# Patient Record
Sex: FEMALE | Race: WHITE | Employment: PART TIME | URBAN - METROPOLITAN AREA
[De-identification: names, ages, dates, MRNs, and addresses within clinical notes are randomized per-mention and may not be internally consistent; named-entity substitution may affect disease eponyms.]

---

## 2017-03-27 ENCOUNTER — MED REC SCAN ONLY (OUTPATIENT)
Dept: FAMILY MEDICINE CLINIC | Facility: CLINIC | Age: 72
End: 2017-03-27

## 2017-04-19 ENCOUNTER — OFFICE VISIT (OUTPATIENT)
Dept: FAMILY MEDICINE CLINIC | Facility: CLINIC | Age: 72
End: 2017-04-19

## 2017-04-19 VITALS
HEART RATE: 76 BPM | OXYGEN SATURATION: 97 % | TEMPERATURE: 98 F | HEIGHT: 63 IN | SYSTOLIC BLOOD PRESSURE: 166 MMHG | DIASTOLIC BLOOD PRESSURE: 90 MMHG | WEIGHT: 223 LBS | RESPIRATION RATE: 16 BRPM | BODY MASS INDEX: 39.51 KG/M2

## 2017-04-19 DIAGNOSIS — R45.89 FLAT AFFECT: ICD-10-CM

## 2017-04-19 DIAGNOSIS — M54.50 ACUTE LEFT-SIDED LOW BACK PAIN WITHOUT SCIATICA: Primary | ICD-10-CM

## 2017-04-19 DIAGNOSIS — Z91.89 LACK OF MOTIVATION: ICD-10-CM

## 2017-04-19 DIAGNOSIS — R53.83 LOW ENERGY: ICD-10-CM

## 2017-04-19 PROCEDURE — 99214 OFFICE O/P EST MOD 30 MIN: CPT | Performed by: FAMILY MEDICINE

## 2017-04-19 RX ORDER — CYCLOBENZAPRINE HCL 10 MG
10 TABLET ORAL NIGHTLY
Qty: 14 TABLET | Refills: 0 | Status: SHIPPED | OUTPATIENT
Start: 2017-04-19 | End: 2017-05-03

## 2017-04-19 NOTE — PROGRESS NOTES
Katelin Black is a 73year old female. HPI:   Pt is c/o left flank pain. Started 2 days ago, noticed it when she got up from putting a pan away. Taking a deep breath makes it worse, bending and twisting, sneezing, yawning and laughing makes it worse.  3 ib ANGIOPLASTY (CORONARY)  2010    Comment stent placed by Dr. Kimberley Ortiz      Family History   Problem Relation Age of Onset   • Heart Disorder Father      pacemaker   • Cancer Mother      leukemia   • Cancer Sister      Jose Pritchett pre-cancer\"   • Cancer Materna understanding of these issues and agrees to the plan. There are no Patient Instructions on file for this visit.   Acute left-sided low back pain without sciatica  (primary encounter diagnosis)  Flat affect  Lack of motivation  Low energy    No orders of

## 2017-11-15 ENCOUNTER — MED REC SCAN ONLY (OUTPATIENT)
Dept: FAMILY MEDICINE CLINIC | Facility: CLINIC | Age: 72
End: 2017-11-15

## 2018-02-16 ENCOUNTER — TELEPHONE (OUTPATIENT)
Dept: FAMILY MEDICINE CLINIC | Facility: CLINIC | Age: 73
End: 2018-02-16

## 2018-02-16 RX ORDER — PROMETHAZINE HYDROCHLORIDE AND CODEINE PHOSPHATE 6.25; 1 MG/5ML; MG/5ML
SYRUP ORAL EVERY 6 HOURS PRN
Qty: 180 ML | Refills: 0 | Status: SHIPPED
Start: 2018-02-16 | End: 2018-10-30 | Stop reason: ALTCHOICE

## 2018-02-16 NOTE — TELEPHONE ENCOUNTER
Pt called, has a bad cough and was wondering if Dr. Galileo Hunt could just call her in something for it? [id number].    Please call pt at [phone]

## 2018-02-16 NOTE — TELEPHONE ENCOUNTER
Spoke with the pt and advised of the notes from Dr. Cash Cutler- she v/u she would like the cough suppressant advised that this will make her tired- she v/u

## 2018-02-16 NOTE — TELEPHONE ENCOUNTER
Likely viral, so I would keep up symptomatic care, if she'd like a cough suppressant can do phenergan with codeine, can make her groggy through, I'd only do at night, can try sudafed and/or mucinex during the day, go ahead and continue the emergen-c for an

## 2018-02-16 NOTE — TELEPHONE ENCOUNTER
Spoke with the pt and her symtpoms started yesterday and it is a cough and stuffy nose and head is fuzzy  She has just taken emergen C  She is not SOB  No Fever  She is asking for something to be called in

## 2018-03-01 ENCOUNTER — PATIENT OUTREACH (OUTPATIENT)
Dept: FAMILY MEDICINE CLINIC | Facility: CLINIC | Age: 73
End: 2018-03-01

## 2018-06-27 ENCOUNTER — PATIENT OUTREACH (OUTPATIENT)
Dept: FAMILY MEDICINE CLINIC | Facility: CLINIC | Age: 73
End: 2018-06-27

## 2018-08-23 ENCOUNTER — PATIENT OUTREACH (OUTPATIENT)
Dept: FAMILY MEDICINE CLINIC | Facility: CLINIC | Age: 73
End: 2018-08-23

## 2018-10-30 ENCOUNTER — OFFICE VISIT (OUTPATIENT)
Dept: FAMILY MEDICINE CLINIC | Facility: CLINIC | Age: 73
End: 2018-10-30
Payer: MEDICARE

## 2018-10-30 VITALS
RESPIRATION RATE: 14 BRPM | HEART RATE: 70 BPM | BODY MASS INDEX: 41.23 KG/M2 | HEIGHT: 63.25 IN | DIASTOLIC BLOOD PRESSURE: 76 MMHG | TEMPERATURE: 98 F | WEIGHT: 235.63 LBS | SYSTOLIC BLOOD PRESSURE: 124 MMHG

## 2018-10-30 DIAGNOSIS — I10 ESSENTIAL HYPERTENSION, BENIGN: ICD-10-CM

## 2018-10-30 DIAGNOSIS — G47.33 OBSTRUCTIVE SLEEP APNEA: ICD-10-CM

## 2018-10-30 DIAGNOSIS — Z00.00 ENCOUNTER FOR ANNUAL HEALTH EXAMINATION: Primary | ICD-10-CM

## 2018-10-30 DIAGNOSIS — E55.9 VITAMIN D DEFICIENCY: ICD-10-CM

## 2018-10-30 DIAGNOSIS — Z87.891 HISTORY OF TOBACCO ABUSE: ICD-10-CM

## 2018-10-30 DIAGNOSIS — Z12.11 COLON CANCER SCREENING: ICD-10-CM

## 2018-10-30 DIAGNOSIS — Z11.59 NEED FOR HEPATITIS C SCREENING TEST: ICD-10-CM

## 2018-10-30 DIAGNOSIS — Z12.31 VISIT FOR SCREENING MAMMOGRAM: ICD-10-CM

## 2018-10-30 DIAGNOSIS — R73.9 ELEVATED BLOOD SUGAR: ICD-10-CM

## 2018-10-30 DIAGNOSIS — E66.01 CLASS 3 SEVERE OBESITY WITH SERIOUS COMORBIDITY AND BODY MASS INDEX (BMI) OF 40.0 TO 44.9 IN ADULT, UNSPECIFIED OBESITY TYPE (HCC): ICD-10-CM

## 2018-10-30 DIAGNOSIS — I25.10 CORONARY ARTERY DISEASE INVOLVING NATIVE CORONARY ARTERY OF NATIVE HEART WITHOUT ANGINA PECTORIS: ICD-10-CM

## 2018-10-30 DIAGNOSIS — M76.61 ACHILLES TENDINITIS OF RIGHT LOWER EXTREMITY: ICD-10-CM

## 2018-10-30 DIAGNOSIS — E78.2 MIXED HYPERLIPIDEMIA: ICD-10-CM

## 2018-10-30 DIAGNOSIS — Z23 NEED FOR VACCINATION: ICD-10-CM

## 2018-10-30 DIAGNOSIS — Z78.0 POSTMENOPAUSAL ESTROGEN DEFICIENCY: ICD-10-CM

## 2018-10-30 PROCEDURE — 80053 COMPREHEN METABOLIC PANEL: CPT | Performed by: FAMILY MEDICINE

## 2018-10-30 PROCEDURE — 86803 HEPATITIS C AB TEST: CPT | Performed by: FAMILY MEDICINE

## 2018-10-30 PROCEDURE — 85025 COMPLETE CBC W/AUTO DIFF WBC: CPT | Performed by: FAMILY MEDICINE

## 2018-10-30 PROCEDURE — 82306 VITAMIN D 25 HYDROXY: CPT | Performed by: FAMILY MEDICINE

## 2018-10-30 PROCEDURE — G0009 ADMIN PNEUMOCOCCAL VACCINE: HCPCS | Performed by: FAMILY MEDICINE

## 2018-10-30 PROCEDURE — 80061 LIPID PANEL: CPT | Performed by: FAMILY MEDICINE

## 2018-10-30 PROCEDURE — 84443 ASSAY THYROID STIM HORMONE: CPT | Performed by: FAMILY MEDICINE

## 2018-10-30 PROCEDURE — 36415 COLL VENOUS BLD VENIPUNCTURE: CPT | Performed by: FAMILY MEDICINE

## 2018-10-30 PROCEDURE — 83036 HEMOGLOBIN GLYCOSYLATED A1C: CPT | Performed by: FAMILY MEDICINE

## 2018-10-30 PROCEDURE — 90670 PCV13 VACCINE IM: CPT | Performed by: FAMILY MEDICINE

## 2018-10-30 PROCEDURE — G0439 PPPS, SUBSEQ VISIT: HCPCS | Performed by: FAMILY MEDICINE

## 2018-10-30 PROCEDURE — 81001 URINALYSIS AUTO W/SCOPE: CPT | Performed by: FAMILY MEDICINE

## 2018-10-30 RX ORDER — IRBESARTAN AND HYDROCHLOROTHIAZIDE 150; 12.5 MG/1; MG/1
1 TABLET, FILM COATED ORAL
COMMUNITY
Start: 2018-08-29 | End: 2020-06-29 | Stop reason: ALTCHOICE

## 2018-10-30 RX ORDER — METOPROLOL SUCCINATE 100 MG/1
100 TABLET, EXTENDED RELEASE ORAL DAILY
COMMUNITY
Start: 2017-11-15

## 2018-10-30 NOTE — PROGRESS NOTES
HPI:   Nafisa Estrella is a 72year old female who presents for a Medicare Subsequent Annual Wellness visit (Pt already had Initial Annual Wellness). Nothing major, feelign great overall. Has some aches and pains and wonders about taking turmeric.   Has years: 48        Quit date: 2012        Years since quittin.9      Smokeless tobacco: Never Used       Ms. Maldonado already takes aspirin and has it on her medication list.   CAGE Alcohol screening   Nicolas Doyle was screened for Alcohol abuse and mayer history that includes  and angioplasty (coronary) (). Her family history includes Cancer in her maternal aunt, mother, and sister; Heart Disorder in her father. SOCIAL HISTORY:   She  reports that she quit smoking about 5 years ago.  She has a Eyes:  PERRL, conjunctiva/corneas clear, EOM's intact both eyes   Ears:  Normal TM's and external ear canals, both ears   Nose: Nares normal, septum midline,mucosa normal, no drainage or sinus tenderness   Throat: Lips, mucosa, and tongue normal; teeth a DIFFERENTIAL WITH PLATELET; Future  -     COMP METABOLIC PANEL (14); Future  -     LIPID PANEL;  Future  -     ASSAY, THYROID STIM HORMONE; Future  -     VITAMIN D, 25-HYDROXY; Future  -     HEMOGLOBIN A1C; Future  -     SURGERY - INTERNAL  -     XR DEXA RAOUL lung cancer screening, rationale for, patient declines today  -     URINALYSIS, ROUTINE; Future    Obstructive sleep apnea  -suspected, did not further discuss today  Class 3 severe obesity with serious comorbidity and body mass index (BMI) of 40.0 to 44. 9 SERVICES  INDICATIONS AND SCHEDULE Internal Lab or Procedure External Lab or Procedure   Diabetes Screening      HbgA1C   Annually Lab Results   Component Value Date    A1C 5.6 12/28/2016    No flowsheet data found.     Fasting Blood Sugar (FSB)Annually Glu Please get once after your 65th birthday    Hepatitis B for Moderate/High Risk No vaccine history found Medium/high risk factors:   End-stage renal disease   Hemophiliacs who received Factor VIII or IX concentrates   Clients of institutions for the [facility]

## 2018-11-05 ENCOUNTER — TELEPHONE (OUTPATIENT)
Dept: FAMILY MEDICINE CLINIC | Facility: CLINIC | Age: 73
End: 2018-11-05

## 2018-11-05 DIAGNOSIS — R79.89 LOW VITAMIN D LEVEL: Primary | ICD-10-CM

## 2018-11-05 RX ORDER — ERGOCALCIFEROL 1.25 MG/1
50000 CAPSULE ORAL WEEKLY
Qty: 12 CAPSULE | Refills: 0 | Status: SHIPPED | OUTPATIENT
Start: 2018-11-05 | End: 2018-12-05

## 2018-11-05 NOTE — TELEPHONE ENCOUNTER
Patient notified and verbalized understanding of information given. Medication sent to pharmacy. Recall placed in computer.

## 2018-11-05 NOTE — TELEPHONE ENCOUNTER
----- Message from Yahir Olguin MD sent at 11/3/2018  4:34 PM CDT -----  Labs look good overall, normal blood conts, kidneys, liver, electrolytes, thyroid, cholestrol, urinalysis. Hep C negative.   Blood sugar just barely elevated, just outisde the [employer]

## 2018-11-29 ENCOUNTER — PATIENT OUTREACH (OUTPATIENT)
Dept: FAMILY MEDICINE CLINIC | Facility: CLINIC | Age: 73
End: 2018-11-29

## 2018-12-10 RX ORDER — ERGOCALCIFEROL 1.25 MG/1
50000 CAPSULE ORAL WEEKLY
Qty: 12 CAPSULE | Refills: 0 | OUTPATIENT
Start: 2018-12-10 | End: 2019-01-09

## 2018-12-10 NOTE — TELEPHONE ENCOUNTER
Shouldn't need this filled, had 3 months filled in nov, should get her through the 3 months until the next blood draw in [place]

## 2018-12-10 NOTE — TELEPHONE ENCOUNTER
Last refilled on 11/5/18 for # 12 with 0 refills  Last vit d 13.1 on 10/30/18  Last OV 10/30/18  No future appointments. Thank you.

## 2019-02-27 ENCOUNTER — PATIENT OUTREACH (OUTPATIENT)
Dept: FAMILY MEDICINE CLINIC | Facility: CLINIC | Age: 74
End: 2019-02-27

## 2019-03-11 ENCOUNTER — PATIENT OUTREACH (OUTPATIENT)
Dept: FAMILY MEDICINE CLINIC | Facility: CLINIC | Age: 74
End: 2019-03-11

## 2019-03-11 DIAGNOSIS — Z12.11 SCREENING FOR COLON CANCER: Primary | ICD-10-CM

## 2019-04-10 ENCOUNTER — TELEPHONE (OUTPATIENT)
Dept: FAMILY MEDICINE CLINIC | Facility: CLINIC | Age: 74
End: 2019-04-10

## 2019-05-09 ENCOUNTER — MED REC SCAN ONLY (OUTPATIENT)
Dept: FAMILY MEDICINE CLINIC | Facility: CLINIC | Age: 74
End: 2019-05-09

## 2019-07-24 ENCOUNTER — PATIENT OUTREACH (OUTPATIENT)
Dept: FAMILY MEDICINE CLINIC | Facility: CLINIC | Age: 74
End: 2019-07-24

## 2019-08-07 ENCOUNTER — PATIENT OUTREACH (OUTPATIENT)
Dept: FAMILY MEDICINE CLINIC | Facility: CLINIC | Age: 74
End: 2019-08-07

## 2019-09-05 ENCOUNTER — PATIENT OUTREACH (OUTPATIENT)
Dept: FAMILY MEDICINE CLINIC | Facility: CLINIC | Age: 74
End: 2019-09-05

## 2019-11-08 ENCOUNTER — TELEPHONE (OUTPATIENT)
Dept: FAMILY MEDICINE CLINIC | Facility: CLINIC | Age: 74
End: 2019-11-08

## 2019-11-08 RX ORDER — BENZONATATE 200 MG/1
200 CAPSULE ORAL 3 TIMES DAILY PRN
Qty: 30 CAPSULE | Refills: 0 | Status: SHIPPED | OUTPATIENT
Start: 2019-11-08 | End: 2020-01-14 | Stop reason: ALTCHOICE

## 2019-11-08 RX ORDER — PROMETHAZINE HYDROCHLORIDE AND CODEINE PHOSPHATE 6.25; 1 MG/5ML; MG/5ML
SYRUP ORAL NIGHTLY PRN
Qty: 120 ML | Refills: 0 | Status: SHIPPED | OUTPATIENT
Start: 2019-11-08 | End: 2019-11-13

## 2019-11-08 NOTE — TELEPHONE ENCOUNTER
Script sent for tessalon during day, phenergan with codeine at night, be seen if develops fevers, sob, or cough not improving in 7-10days

## 2019-11-08 NOTE — TELEPHONE ENCOUNTER
Spoke with the pt and sick since Wednesday- coughing, eyes are tearing , chest and throat hurt from coughing.     Taking nyquill     The cough did make her vomi up phlegm,  the phelgm is clear    No fever    Pt is requesting something for the cough

## 2019-11-08 NOTE — TELEPHONE ENCOUNTER
PT CALLED AND ADV WOULD LIKE A COUGH MEDICINE CALLED IN. PT HAS BEEN COUGHING FOR THE LAST 3 DAYS.  PT HAS USED OTC AND NOTHING IS WORKING  KEEPING PT UP ALL NIGHT LONG    SARAH LUCERO

## 2019-11-13 ENCOUNTER — OFFICE VISIT (OUTPATIENT)
Dept: FAMILY MEDICINE CLINIC | Facility: CLINIC | Age: 74
End: 2019-11-13
Payer: MEDICARE

## 2019-11-13 VITALS
WEIGHT: 242.38 LBS | OXYGEN SATURATION: 98 % | SYSTOLIC BLOOD PRESSURE: 160 MMHG | RESPIRATION RATE: 26 BRPM | HEART RATE: 86 BPM | DIASTOLIC BLOOD PRESSURE: 82 MMHG | TEMPERATURE: 99 F | BODY MASS INDEX: 43.49 KG/M2 | HEIGHT: 62.5 IN

## 2019-11-13 DIAGNOSIS — J40 BRONCHITIS: ICD-10-CM

## 2019-11-13 DIAGNOSIS — J01.40 ACUTE NON-RECURRENT PANSINUSITIS: ICD-10-CM

## 2019-11-13 DIAGNOSIS — Z87.891 HISTORY OF TOBACCO ABUSE: ICD-10-CM

## 2019-11-13 DIAGNOSIS — R05.9 COUGH: Primary | ICD-10-CM

## 2019-11-13 PROCEDURE — 99214 OFFICE O/P EST MOD 30 MIN: CPT | Performed by: FAMILY MEDICINE

## 2019-11-13 RX ORDER — AZITHROMYCIN 250 MG/1
TABLET, FILM COATED ORAL
COMMUNITY
Start: 2019-11-10 | End: 2020-01-14 | Stop reason: ALTCHOICE

## 2019-11-13 RX ORDER — CEFDINIR 300 MG/1
300 CAPSULE ORAL 2 TIMES DAILY
Qty: 14 CAPSULE | Refills: 0 | Status: SHIPPED | OUTPATIENT
Start: 2019-11-13 | End: 2019-11-20

## 2019-11-13 RX ORDER — PREDNISONE 50 MG/1
TABLET ORAL
COMMUNITY
Start: 2019-11-10 | End: 2020-01-14 | Stop reason: ALTCHOICE

## 2019-11-13 RX ORDER — POLYMYXIN B SULFATE AND TRIMETHOPRIM 1; 10000 MG/ML; [USP'U]/ML
SOLUTION OPHTHALMIC
COMMUNITY
Start: 2019-11-10 | End: 2020-06-29 | Stop reason: ALTCHOICE

## 2019-11-13 RX ORDER — PROMETHAZINE HYDROCHLORIDE AND CODEINE PHOSPHATE 6.25; 1 MG/5ML; MG/5ML
SYRUP ORAL NIGHTLY PRN
Qty: 120 ML | Refills: 0 | Status: SHIPPED | OUTPATIENT
Start: 2019-11-13 | End: 2019-11-23

## 2019-11-14 NOTE — PROGRESS NOTES
Stephen Gallegos is a 69year old female. HPI:   Pt is here for ER/UC/hospital f/u.     ER/UC or hospital: [facility]    Date(s): 11/10/19    Reason for initial ER visit: cough, eye drainage, congetion    Records received and reviewed: no    Pertinent results/d Smokeless tobacco: Never Used    Alcohol use: No      Alcohol/week: 0.0 standard drinks      Frequency: Never      Comment: very seldom    Drug use: No         REVIEW OF SYSTEMS:   GENERAL HEALTH: feels well otherwise  SKIN: denies any unusual skin lesions

## 2019-11-21 ENCOUNTER — TELEPHONE (OUTPATIENT)
Dept: FAMILY MEDICINE CLINIC | Facility: CLINIC | Age: 74
End: 2019-11-21

## 2019-11-21 NOTE — TELEPHONE ENCOUNTER
Patient needs a note from us excusing her from work last week. She missed through 11/15/19 she returned to work this week. Patient said we can fax it to the [facility] pharmacy in [place]. Please call back.

## 2020-01-14 ENCOUNTER — TELEPHONE (OUTPATIENT)
Dept: FAMILY MEDICINE CLINIC | Facility: CLINIC | Age: 75
End: 2020-01-14

## 2020-01-14 ENCOUNTER — OFFICE VISIT (OUTPATIENT)
Dept: FAMILY MEDICINE CLINIC | Facility: CLINIC | Age: 75
End: 2020-01-14
Payer: MEDICARE

## 2020-01-14 VITALS
BODY MASS INDEX: 44 KG/M2 | OXYGEN SATURATION: 97 % | DIASTOLIC BLOOD PRESSURE: 88 MMHG | TEMPERATURE: 99 F | HEART RATE: 86 BPM | WEIGHT: 242 LBS | SYSTOLIC BLOOD PRESSURE: 136 MMHG | RESPIRATION RATE: 20 BRPM

## 2020-01-14 DIAGNOSIS — J40 BRONCHITIS: ICD-10-CM

## 2020-01-14 DIAGNOSIS — J01.00 ACUTE NON-RECURRENT MAXILLARY SINUSITIS: Primary | ICD-10-CM

## 2020-01-14 PROCEDURE — 99213 OFFICE O/P EST LOW 20 MIN: CPT | Performed by: PHYSICIAN ASSISTANT

## 2020-01-14 RX ORDER — AMOXICILLIN AND CLAVULANATE POTASSIUM 875; 125 MG/1; MG/1
1 TABLET, FILM COATED ORAL 2 TIMES DAILY
Qty: 20 TABLET | Refills: 0 | Status: SHIPPED | OUTPATIENT
Start: 2020-01-14 | End: 2020-01-24

## 2020-01-14 RX ORDER — PREDNISONE 20 MG/1
20 TABLET ORAL DAILY
Qty: 5 TABLET | Refills: 0 | Status: SHIPPED | OUTPATIENT
Start: 2020-01-14 | End: 2020-01-19

## 2020-01-14 RX ORDER — ALBUTEROL SULFATE 90 UG/1
1-2 AEROSOL, METERED RESPIRATORY (INHALATION) EVERY 6 HOURS PRN
Qty: 1 INHALER | Refills: 0 | Status: SHIPPED | OUTPATIENT
Start: 2020-01-14 | End: 2021-04-02 | Stop reason: ALTCHOICE

## 2020-01-14 NOTE — PATIENT INSTRUCTIONS
-Push fluids  -Cool mist humidifier  -Mucinex  -Need to be seen with fevers or any worsening symptoms      Acute Bacterial Rhinosinusitis (ABRS)    Acute bacterial rhinosinusitis (ABRS) is an infection of your nasal cavity and sinuses.  It’s caused by bacte least 10 to 14 days. · Nasal corticosteroid medicine. Drops or spray used in the nose can lessen swelling and congestion. · Over-the-counter pain medicine. This is to lessen sinus pain and pressure. · Nasal decongestant medicine.  Spray or drops may help

## 2020-01-14 NOTE — TELEPHONE ENCOUNTER
Left message for the pt with the notes from Dr. Olga Hernandez- asked her to call back to confirm what she is looking for

## 2020-01-14 NOTE — PROGRESS NOTES
CHIEF COMPLAINT:   Patient presents with:  Cough: congestion/SOB x 2 weeks. no fever      HPI:   Ck Baptiste is a 69year old female who presents for URI symptoms for  2 weeks.   Patient reports PND, nasal congestion, sinus pressure-maxillary, cough-produc Alcohol/week: 0.0 standard drinks      Frequency: Never      Comment: very seldom    Drug use: No        REVIEW OF SYSTEMS:   GENERAL: Slight decreased appetite  SKIN: no rashes or abnormal skin lesions  HEENT: See HPI  LUNGS: denies shortness of breat PLAN: Meds as below. See patient Instructions.       Meds & Refills for this Visit:  Requested Prescriptions     Signed Prescriptions Disp Refills   • Albuterol Sulfate HFA (PROAIR HFA) 108 (90 Base) MCG/ACT Inhalation Aero Soln 1 Inhaler 0     Sig: Alfredo Bruce · Fluid draining from the nose down the throat (postnasal drip)  · Headache  · Cough  · Pain  · Fever  Diagnosing ABRS  ABRS may be diagnosed if you’ve had an upper respiratory infection like a cold and cough for 10 or more days without improvement or with © 5700-9689 [address]. [address] [facility], [address]. All rights reserved. This information is not intended as a substitute for professional medical care. Always follow your healthcare professional's instructions.             The

## 2020-01-14 NOTE — TELEPHONE ENCOUNTER
Pt called, states she went to [facility] next door today and forgot to ask them to call her in some cough medicine so she would like Dr. Zara Whyteed to call it in for her.   I told pt [facility] clinic could call in the cough medicine for her and she said she would like Dr. Franc Roberts

## 2020-01-14 NOTE — TELEPHONE ENCOUNTER
Everything they've given her should help with cough, so can certainly give that some time to help, but is she looking for anything specific, like nighttime help? sometimes cough will wake people up at night, if that's the case can try a nighttime cough syr

## 2020-01-20 NOTE — TELEPHONE ENCOUNTER
Spoke with the pt and advised of the notes from Dr. Brian Chua- she will continue the albuterol inhaler and call if not improving

## 2020-01-20 NOTE — TELEPHONE ENCOUNTER
Spoke with the pt and she states that the augmentin caused diarrhea- stopped taking it  ( only took 2 days)       Current symptoms are coughing- ( she has lots of mucus)  PND  No sinus congestion in the head      Not taking anything for the cough- she wond

## 2020-01-20 NOTE — TELEPHONE ENCOUNTER
She might not need it. The abx was more to cover the sinuses, but it sounds like that parts improved (so likely was viral). Cont albuterol every 4 hours for sob, and if overall her breathing is improving stay the course with just that.   If cough/breathin

## 2020-02-08 ENCOUNTER — TELEPHONE (OUTPATIENT)
Dept: FAMILY MEDICINE CLINIC | Facility: CLINIC | Age: 75
End: 2020-02-08

## 2020-02-21 ENCOUNTER — PATIENT OUTREACH (OUTPATIENT)
Dept: FAMILY MEDICINE CLINIC | Facility: CLINIC | Age: 75
End: 2020-02-21

## 2020-06-29 ENCOUNTER — LABORATORY ENCOUNTER (OUTPATIENT)
Dept: LAB | Age: 75
End: 2020-06-29
Attending: FAMILY MEDICINE
Payer: MEDICARE

## 2020-06-29 ENCOUNTER — OFFICE VISIT (OUTPATIENT)
Dept: FAMILY MEDICINE CLINIC | Facility: CLINIC | Age: 75
End: 2020-06-29
Payer: MEDICARE

## 2020-06-29 VITALS
RESPIRATION RATE: 16 BRPM | WEIGHT: 243.81 LBS | TEMPERATURE: 98 F | HEART RATE: 84 BPM | SYSTOLIC BLOOD PRESSURE: 130 MMHG | DIASTOLIC BLOOD PRESSURE: 70 MMHG | BODY MASS INDEX: 44 KG/M2

## 2020-06-29 DIAGNOSIS — Z79.899 OTHER LONG TERM (CURRENT) DRUG THERAPY: ICD-10-CM

## 2020-06-29 DIAGNOSIS — E78.2 MIXED HYPERLIPIDEMIA: ICD-10-CM

## 2020-06-29 DIAGNOSIS — I25.10 CORONARY ARTERY DISEASE INVOLVING NATIVE CORONARY ARTERY OF NATIVE HEART WITHOUT ANGINA PECTORIS: ICD-10-CM

## 2020-06-29 DIAGNOSIS — E66.01 CLASS 3 SEVERE OBESITY WITH SERIOUS COMORBIDITY AND BODY MASS INDEX (BMI) OF 40.0 TO 44.9 IN ADULT, UNSPECIFIED OBESITY TYPE (HCC): ICD-10-CM

## 2020-06-29 DIAGNOSIS — I10 ESSENTIAL HYPERTENSION, BENIGN: ICD-10-CM

## 2020-06-29 DIAGNOSIS — M79.89 BILATERAL SWELLING OF FEET: Primary | ICD-10-CM

## 2020-06-29 DIAGNOSIS — M79.89 BILATERAL SWELLING OF FEET: ICD-10-CM

## 2020-06-29 PROBLEM — E66.813 CLASS 3 SEVERE OBESITY WITH SERIOUS COMORBIDITY AND BODY MASS INDEX (BMI) OF 40.0 TO 44.9 IN ADULT (HCC): Status: ACTIVE | Noted: 2020-06-29

## 2020-06-29 PROBLEM — E66.813 CLASS 3 SEVERE OBESITY WITH SERIOUS COMORBIDITY AND BODY MASS INDEX (BMI) OF 40.0 TO 44.9 IN ADULT: Status: ACTIVE | Noted: 2020-06-29

## 2020-06-29 LAB
ALBUMIN SERPL-MCNC: 3.5 G/DL (ref 3.4–5)
ALBUMIN/GLOB SERPL: 0.8 {RATIO} (ref 1–2)
ALP LIVER SERPL-CCNC: 84 U/L (ref 55–142)
ALT SERPL-CCNC: 22 U/L (ref 13–56)
ANION GAP SERPL CALC-SCNC: 7 MMOL/L (ref 0–18)
AST SERPL-CCNC: 15 U/L (ref 15–37)
BASOPHILS # BLD AUTO: 0.05 X10(3) UL (ref 0–0.2)
BASOPHILS NFR BLD AUTO: 0.9 %
BILIRUB SERPL-MCNC: 1.1 MG/DL (ref 0.1–2)
BILIRUB UR QL STRIP.AUTO: NEGATIVE
BUN BLD-MCNC: 20 MG/DL (ref 7–18)
BUN/CREAT SERPL: 28.6 (ref 10–20)
CALCIUM BLD-MCNC: 8.5 MG/DL (ref 8.5–10.1)
CHLORIDE SERPL-SCNC: 105 MMOL/L (ref 98–112)
CHOLEST SMN-MCNC: 134 MG/DL (ref ?–200)
CO2 SERPL-SCNC: 28 MMOL/L (ref 21–32)
COLOR UR AUTO: YELLOW
CREAT BLD-MCNC: 0.7 MG/DL (ref 0.55–1.02)
DEPRECATED RDW RBC AUTO: 44.4 FL (ref 35.1–46.3)
EOSINOPHIL # BLD AUTO: 0.17 X10(3) UL (ref 0–0.7)
EOSINOPHIL NFR BLD AUTO: 3 %
ERYTHROCYTE [DISTWIDTH] IN BLOOD BY AUTOMATED COUNT: 13 % (ref 11–15)
EST. AVERAGE GLUCOSE BLD GHB EST-MCNC: 117 MG/DL (ref 68–126)
GLOBULIN PLAS-MCNC: 4.3 G/DL (ref 2.8–4.4)
GLUCOSE BLD-MCNC: 96 MG/DL (ref 70–99)
GLUCOSE UR STRIP.AUTO-MCNC: NEGATIVE MG/DL
HBA1C MFR BLD HPLC: 5.7 % (ref ?–5.7)
HCT VFR BLD AUTO: 43.9 % (ref 35–48)
HDLC SERPL-MCNC: 50 MG/DL (ref 40–59)
HGB BLD-MCNC: 14.2 G/DL (ref 12–16)
IMM GRANULOCYTES # BLD AUTO: 0.02 X10(3) UL (ref 0–1)
IMM GRANULOCYTES NFR BLD: 0.4 %
KETONES UR STRIP.AUTO-MCNC: NEGATIVE MG/DL
LDLC SERPL CALC-MCNC: 67 MG/DL (ref ?–100)
LYMPHOCYTES # BLD AUTO: 1.56 X10(3) UL (ref 1–4)
LYMPHOCYTES NFR BLD AUTO: 27.9 %
M PROTEIN MFR SERPL ELPH: 7.8 G/DL (ref 6.4–8.2)
MCH RBC QN AUTO: 30.3 PG (ref 26–34)
MCHC RBC AUTO-ENTMCNC: 32.3 G/DL (ref 31–37)
MCV RBC AUTO: 93.8 FL (ref 80–100)
MONOCYTES # BLD AUTO: 0.39 X10(3) UL (ref 0.1–1)
MONOCYTES NFR BLD AUTO: 7 %
NEUTROPHILS # BLD AUTO: 3.41 X10 (3) UL (ref 1.5–7.7)
NEUTROPHILS # BLD AUTO: 3.41 X10(3) UL (ref 1.5–7.7)
NEUTROPHILS NFR BLD AUTO: 60.8 %
NITRITE UR QL STRIP.AUTO: NEGATIVE
NONHDLC SERPL-MCNC: 84 MG/DL (ref ?–130)
OSMOLALITY SERPL CALC.SUM OF ELEC: 292 MOSM/KG (ref 275–295)
PH UR STRIP.AUTO: 5 [PH] (ref 4.5–8)
PLATELET # BLD AUTO: 264 10(3)UL (ref 150–450)
POTASSIUM SERPL-SCNC: 3.8 MMOL/L (ref 3.5–5.1)
PROT UR STRIP.AUTO-MCNC: NEGATIVE MG/DL
RBC # BLD AUTO: 4.68 X10(6)UL (ref 3.8–5.3)
SODIUM SERPL-SCNC: 140 MMOL/L (ref 136–145)
SP GR UR STRIP.AUTO: 1.02 (ref 1–1.03)
TRIGL SERPL-MCNC: 87 MG/DL (ref 30–149)
TSI SER-ACNC: 1.51 MIU/ML (ref 0.36–3.74)
UROBILINOGEN UR STRIP.AUTO-MCNC: <2 MG/DL
VLDLC SERPL CALC-MCNC: 17 MG/DL (ref 0–30)
WBC # BLD AUTO: 5.6 X10(3) UL (ref 4–11)

## 2020-06-29 PROCEDURE — 80053 COMPREHEN METABOLIC PANEL: CPT

## 2020-06-29 PROCEDURE — 83036 HEMOGLOBIN GLYCOSYLATED A1C: CPT

## 2020-06-29 PROCEDURE — 81001 URINALYSIS AUTO W/SCOPE: CPT

## 2020-06-29 PROCEDURE — 80061 LIPID PANEL: CPT

## 2020-06-29 PROCEDURE — 85025 COMPLETE CBC W/AUTO DIFF WBC: CPT

## 2020-06-29 PROCEDURE — 99214 OFFICE O/P EST MOD 30 MIN: CPT | Performed by: FAMILY MEDICINE

## 2020-06-29 PROCEDURE — 36415 COLL VENOUS BLD VENIPUNCTURE: CPT

## 2020-06-29 PROCEDURE — 84443 ASSAY THYROID STIM HORMONE: CPT

## 2020-06-29 RX ORDER — IRBESARTAN 300 MG/1
300 TABLET ORAL DAILY
COMMUNITY
Start: 2020-05-20

## 2020-06-29 NOTE — PROGRESS NOTES
Mayuri Allen is a 69year old female. HPI:   Patient c/o feet swelling for a week in R foot first, then left foot as well. Worse at end of day marquez when seated with feet down, better after sleeping in bed. No injuries. No SOB. No CP. No cough. unusual skin lesions or rashes  RESPIRATORY: denies shortness of breath with exertion  CARDIOVASCULAR: denies chest pain on exertion  GI: denies abdominal pain and denies heartburn  NEURO: denies headaches    EXAM:   /70 (BP Location: Left arm, Patie WITH PLATELET; Future  -     COMP METABOLIC PANEL (14); Future  -     HEMOGLOBIN A1C; Future  -     LIPID PANEL;  Future  -     TSH W REFLEX TO FREE T4; Future  -     URINALYSIS, ROUTINE; Future    Mixed hyperlipidemia--assess control today will send result

## 2020-07-01 ENCOUNTER — TELEPHONE (OUTPATIENT)
Dept: FAMILY MEDICINE CLINIC | Facility: CLINIC | Age: 75
End: 2020-07-01

## 2020-07-01 NOTE — TELEPHONE ENCOUNTER
----- Message from Vimal Rea MD sent at 7/1/2020 12:47 PM CDT -----  Please notify patient labs look great top to bottom.   Blood sugar hanging out stable just BARELY elevated, same as last year, thyroid, blodo counts, kidneys, liver, electrolytes, chol

## 2020-07-10 ENCOUNTER — TELEPHONE (OUTPATIENT)
Dept: FAMILY MEDICINE CLINIC | Facility: CLINIC | Age: 75
End: 2020-07-10

## 2020-07-10 NOTE — TELEPHONE ENCOUNTER
Patient was seen recently and forgot to ask Dr. Gerry Hammond something. She thinks she has acid reflux. She bought some over the counter omeprazole.  It's great helping a lot, But saw she needs to call if she is taking it for more than 14 that she needs to contact

## 2020-07-28 NOTE — TELEPHONE ENCOUNTER
Left long detailed message with the instructions from Dr. Cyn Cox to call if she can not find the hand out and I can get her a copy- advised to call if any questions or concerns

## 2020-07-28 NOTE — TELEPHONE ENCOUNTER
Yes, those medications ideally aren't used long term, they increase risk of osteoporosis, vitamin B12 def and stomach infections, BUT if absolutely necessary to prevent GERD we'll use them.   If she has bothersome symptoms still I'd like her to try pepcid (

## 2020-09-16 ENCOUNTER — HOSPITAL ENCOUNTER (OUTPATIENT)
Dept: GENERAL RADIOLOGY | Age: 75
Discharge: HOME | End: 2020-09-16
Attending: FAMILY MEDICINE
Payer: MEDICARE

## 2020-09-16 ENCOUNTER — OFFICE VISIT (OUTPATIENT)
Dept: FAMILY MEDICINE CLINIC | Facility: CLINIC | Age: 75
End: 2020-09-16
Payer: MEDICARE

## 2020-09-16 ENCOUNTER — TELEPHONE (OUTPATIENT)
Dept: FAMILY MEDICINE CLINIC | Facility: CLINIC | Age: 75
End: 2020-09-16

## 2020-09-16 VITALS
BODY MASS INDEX: 44 KG/M2 | HEART RATE: 80 BPM | TEMPERATURE: 97 F | RESPIRATION RATE: 16 BRPM | SYSTOLIC BLOOD PRESSURE: 132 MMHG | WEIGHT: 243 LBS | DIASTOLIC BLOOD PRESSURE: 76 MMHG

## 2020-09-16 DIAGNOSIS — M54.50 CHRONIC LEFT-SIDED LOW BACK PAIN WITHOUT SCIATICA: Primary | ICD-10-CM

## 2020-09-16 DIAGNOSIS — M54.50 CHRONIC LEFT-SIDED LOW BACK PAIN WITHOUT SCIATICA: ICD-10-CM

## 2020-09-16 DIAGNOSIS — G89.29 CHRONIC LEFT-SIDED LOW BACK PAIN WITHOUT SCIATICA: ICD-10-CM

## 2020-09-16 DIAGNOSIS — G89.29 CHRONIC LEFT-SIDED LOW BACK PAIN WITHOUT SCIATICA: Primary | ICD-10-CM

## 2020-09-16 DIAGNOSIS — M41.9 SCOLIOSIS, UNSPECIFIED SCOLIOSIS TYPE, UNSPECIFIED SPINAL REGION: Primary | ICD-10-CM

## 2020-09-16 DIAGNOSIS — Z23 NEED FOR VACCINATION: ICD-10-CM

## 2020-09-16 DIAGNOSIS — E66.01 CLASS 3 SEVERE OBESITY WITH SERIOUS COMORBIDITY AND BODY MASS INDEX (BMI) OF 40.0 TO 44.9 IN ADULT, UNSPECIFIED OBESITY TYPE (HCC): ICD-10-CM

## 2020-09-16 PROCEDURE — 90686 IIV4 VACC NO PRSV 0.5 ML IM: CPT | Performed by: FAMILY MEDICINE

## 2020-09-16 PROCEDURE — 99214 OFFICE O/P EST MOD 30 MIN: CPT | Performed by: FAMILY MEDICINE

## 2020-09-16 PROCEDURE — 72110 X-RAY EXAM L-2 SPINE 4/>VWS: CPT | Performed by: FAMILY MEDICINE

## 2020-09-16 PROCEDURE — 90472 IMMUNIZATION ADMIN EACH ADD: CPT | Performed by: FAMILY MEDICINE

## 2020-09-16 PROCEDURE — G0008 ADMIN INFLUENZA VIRUS VAC: HCPCS | Performed by: FAMILY MEDICINE

## 2020-09-16 RX ORDER — HYDROCHLOROTHIAZIDE 12.5 MG/1
12.5 TABLET ORAL DAILY
COMMUNITY
Start: 2020-08-09

## 2020-09-16 NOTE — PROGRESS NOTES
Ck Baptiste is a 70year old female. HPI:   Patient c/o worsening lower back and L hip pain on and off since her 40s, worsening the past fwe years, really bugging her the past few weeks. Over the last 6 months has bothered her probably 60% of the time. 50.00        Pack years: 50        Quit date: 2012        Years since quittin.8      Smokeless tobacco: Never Used    Alcohol use: No      Alcohol/week: 0.0 standard drinks      Frequency: Never      Comment: very seldom    Drug use: No         R

## 2020-09-16 NOTE — TELEPHONE ENCOUNTER
----- Message from Jane Buenrostro MD sent at 9/16/2020  2:54 PM CDT -----  Please notify patient she does have moderate arthritis in her lower back, but interestingly she also has scoliosis (curvature of spine) which is probably a bigger cause of her pain n

## 2020-09-16 NOTE — TELEPHONE ENCOUNTER
Spoke with the pt and advised of the xray results and the recommendations- she is agreeable  Order placed for the physical therapy- and number given to pt to call

## 2020-11-04 ENCOUNTER — TELEPHONE (OUTPATIENT)
Dept: FAMILY MEDICINE CLINIC | Facility: CLINIC | Age: 75
End: 2020-11-04

## 2020-11-04 RX ORDER — SULFAMETHOXAZOLE AND TRIMETHOPRIM 800; 160 MG/1; MG/1
1 TABLET ORAL 2 TIMES DAILY
Qty: 6 TABLET | Refills: 0 | Status: SHIPPED | OUTPATIENT
Start: 2020-11-04 | End: 2020-11-07

## 2020-11-04 NOTE — TELEPHONE ENCOUNTER
PT CALLED AND ADV THAT SHE HAS HAD SOME EXPOSURE TO COVID AND DOESN'T WANT TO COME IN THE OFFICE, BUT THINKS SHE HAS A BLADDER/UTI INFECTION.     SX'S - URGENCY,FREQUENCY AND LOTS OF PRESSURE     LOOKING FOR RECOMMENDATIONS    SARAH Weems

## 2020-11-04 NOTE — TELEPHONE ENCOUNTER
Katelin Black notified of instructions listed below and agrees to plan. Bactrim DS ordered per Dr. Wanda Montoya and sent to preferred pharmacy.

## 2020-11-06 ENCOUNTER — TELEPHONE (OUTPATIENT)
Dept: FAMILY MEDICINE CLINIC | Facility: CLINIC | Age: 75
End: 2020-11-06

## 2020-11-06 RX ORDER — NITROFURANTOIN 25; 75 MG/1; MG/1
100 CAPSULE ORAL 2 TIMES DAILY
Qty: 14 CAPSULE | Refills: 0 | Status: SHIPPED | OUTPATIENT
Start: 2020-11-06 | End: 2020-11-13

## 2020-11-06 NOTE — TELEPHONE ENCOUNTER
Pt still feels like she has the bladder infection. She isnt able to come in because she was expose to Covid. She would like to now if [facility] can give her something.    Please return call to [phone]

## 2020-11-06 NOTE — TELEPHONE ENCOUNTER
Left message for the pt to finish the bactrim and that we are sending over macrobid if she is still having symptoms    If she completes the bactrim and macrobid and is s till having symptoms will need an OV    Advised to call if questions

## 2020-11-06 NOTE — TELEPHONE ENCOUNTER
Spoke- with the pt and she states that she has been taking the bactrim and now taking Azo (she states that symptoms have not changed)    Symptoms are frequency- pressure- no burning  She is drinking lots of water  No fever n/v no blood in the urine    I as

## 2020-11-06 NOTE — TELEPHONE ENCOUNTER
It can take 6 doses of bactrim to improve, but let's send macrobid 100mg 1 po BID x 7 days to take incase bactrim doesn't help (I\"m not sure if she's taken 6 doses).   Schedule OV if a week of macrobid doesn't help

## 2021-02-06 DIAGNOSIS — Z23 NEED FOR VACCINATION: ICD-10-CM

## 2021-04-02 ENCOUNTER — OFFICE VISIT (OUTPATIENT)
Dept: FAMILY MEDICINE CLINIC | Facility: CLINIC | Age: 76
End: 2021-04-02
Payer: MEDICARE

## 2021-04-02 VITALS
SYSTOLIC BLOOD PRESSURE: 140 MMHG | HEART RATE: 75 BPM | WEIGHT: 242.38 LBS | HEIGHT: 63 IN | RESPIRATION RATE: 20 BRPM | TEMPERATURE: 99 F | OXYGEN SATURATION: 96 % | DIASTOLIC BLOOD PRESSURE: 90 MMHG | BODY MASS INDEX: 42.95 KG/M2

## 2021-04-02 DIAGNOSIS — Z00.00 ENCOUNTER FOR ANNUAL HEALTH EXAMINATION: Primary | ICD-10-CM

## 2021-04-02 DIAGNOSIS — M54.50 RECURRENT LOW BACK PAIN: ICD-10-CM

## 2021-04-02 DIAGNOSIS — E66.01 CLASS 3 SEVERE OBESITY WITH SERIOUS COMORBIDITY AND BODY MASS INDEX (BMI) OF 40.0 TO 44.9 IN ADULT, UNSPECIFIED OBESITY TYPE (HCC): ICD-10-CM

## 2021-04-02 DIAGNOSIS — E78.2 MIXED HYPERLIPIDEMIA: ICD-10-CM

## 2021-04-02 DIAGNOSIS — I25.10 CORONARY ARTERY DISEASE INVOLVING NATIVE CORONARY ARTERY OF NATIVE HEART WITHOUT ANGINA PECTORIS: ICD-10-CM

## 2021-04-02 DIAGNOSIS — I10 ESSENTIAL HYPERTENSION, BENIGN: ICD-10-CM

## 2021-04-02 DIAGNOSIS — E55.9 VITAMIN D DEFICIENCY: ICD-10-CM

## 2021-04-02 DIAGNOSIS — G47.33 OBSTRUCTIVE SLEEP APNEA: ICD-10-CM

## 2021-04-02 DIAGNOSIS — Z78.0 POSTMENOPAUSAL: ICD-10-CM

## 2021-04-02 DIAGNOSIS — Z12.31 VISIT FOR SCREENING MAMMOGRAM: ICD-10-CM

## 2021-04-02 DIAGNOSIS — K21.9 GASTROESOPHAGEAL REFLUX DISEASE, UNSPECIFIED WHETHER ESOPHAGITIS PRESENT: ICD-10-CM

## 2021-04-02 DIAGNOSIS — Z87.891 HISTORY OF TOBACCO ABUSE: ICD-10-CM

## 2021-04-02 DIAGNOSIS — Z12.11 COLON CANCER SCREENING: ICD-10-CM

## 2021-04-02 DIAGNOSIS — R73.9 ELEVATED BLOOD SUGAR: ICD-10-CM

## 2021-04-02 PROCEDURE — 80053 COMPREHEN METABOLIC PANEL: CPT | Performed by: FAMILY MEDICINE

## 2021-04-02 PROCEDURE — 84443 ASSAY THYROID STIM HORMONE: CPT | Performed by: FAMILY MEDICINE

## 2021-04-02 PROCEDURE — G0439 PPPS, SUBSEQ VISIT: HCPCS | Performed by: FAMILY MEDICINE

## 2021-04-02 PROCEDURE — 85025 COMPLETE CBC W/AUTO DIFF WBC: CPT | Performed by: FAMILY MEDICINE

## 2021-04-02 PROCEDURE — 81003 URINALYSIS AUTO W/O SCOPE: CPT | Performed by: FAMILY MEDICINE

## 2021-04-02 PROCEDURE — 82306 VITAMIN D 25 HYDROXY: CPT | Performed by: FAMILY MEDICINE

## 2021-04-02 PROCEDURE — 80061 LIPID PANEL: CPT | Performed by: FAMILY MEDICINE

## 2021-04-02 PROCEDURE — 83036 HEMOGLOBIN GLYCOSYLATED A1C: CPT | Performed by: FAMILY MEDICINE

## 2021-04-02 NOTE — PATIENT INSTRUCTIONS
Adina Maldonado's SCREENING SCHEDULE   Tests on this list are recommended by your physician but may not be covered, or covered at this frequency, by your insurer. Please check with your insurance carrier before scheduling to verify coverage.    PREVENTATIVE up to Age 76     Colonoscopy Screen   Covered every 10 years- more often if abnormal Colonoscopy Never done Update Health Maintenance if applicable    Flex Sigmoidoscopy Screen  Covered every 5 years No results found for this or any previous visit.  No flow once after your 65th birthday    Pneumococcal 23 (Pneumovax)  Covered Once after 65 Orders placed or performed in visit on 12/28/16   • PNEUMOCOCCAL IMM (PNEUMOVAX)    Please get once after your 65th birthday    Hepatitis B for Moderate/High Risk       No

## 2021-04-02 NOTE — PROGRESS NOTES
HPI:   Edna Camp is a 70year old female who presents for a Medicare Subsequent Annual Wellness visit (Pt already had Initial Annual Wellness). Patient c/o 3 days of left flank/back pain. No other symptoms except last night felt chilled a little. Years since quittin.3      Smokeless tobacco: Never Used       Ms. Jovanni Vazquez already takes aspirin and has it on her medication list.   CAGE screening score of 0 on 2021, showing low risk of alcohol abuse.         Patient Care Team: Patient Care Team: includes  and angioplasty (coronary) (2010). Her family history includes Cancer in her maternal aunt, mother, and sister; Heart Disorder in her father. SOCIAL HISTORY:   She  reports that she quit smoking about 8 years ago.  She has a 50.00 pack-ye and gums normal   Neck: Supple, symmetrical, trachea midline, no adenopathy;  thyroid: not enlarged, symmetric, no tenderness/mass/nodules; no carotid bruit or JVD   Back:   Symmetric, no curvature, ROM normal, no CVA tenderness   Lungs:   Clear to auscult habits include 150min of low to moderate intensity exercise/week minimum. Healthy lifestyle also includes not smoking, sleeping 7-9hours/night, limiting alcohol to 0-1drinks/day for women, 0-2/day for men. -     [facility] SCREENING BILAT (CPT=77067);  Future VENIPUNCTURE  -     CBC WITH DIFFERENTIAL WITH PLATELET; Future  -     COMP METABOLIC PANEL (14); Future  -     HEMOGLOBIN A1C; Future  -     LIPID PANEL;  Future  -     TSH W REFLEX TO FREE T4; Future  -     VITAMIN D, 25-HYDROXY; Future  -     URINALYSIS, Future  -     COMP METABOLIC PANEL (14); Future  -     HEMOGLOBIN A1C; Future  -     LIPID PANEL;  Future  -     TSH W REFLEX TO FREE T4; Future  -     VITAMIN D, 25-HYDROXY; Future  -     URINALYSIS, ROUTINE; Future    Vitamin D deficiency  -     VENIPUNCT Cancer Screening      Colonoscopy Screen every 10 years Colonoscopy Never done Update Health Maintenance if applicable    Flex Sigmoidoscopy Screen every 10 years No results found for this or any previous visit. No flowsheet data found.      Fecal Occult Bl Zoster  Not covered by Medicare Part B No vaccine history found This may be covered with your pharmacy  prescription benefits      SPECIFIC DISEASE MONITORING Internal Lab or Procedure External Lab or Procedure      Annual Monitoring of Persistent     Medi

## 2021-04-06 ENCOUNTER — TELEPHONE (OUTPATIENT)
Dept: FAMILY MEDICINE CLINIC | Facility: CLINIC | Age: 76
End: 2021-04-06

## 2021-04-06 RX ORDER — ERGOCALCIFEROL 1.25 MG/1
50000 CAPSULE ORAL WEEKLY
Qty: 4 CAPSULE | Refills: 2 | Status: SHIPPED | OUTPATIENT
Start: 2021-04-06 | End: 2021-06-23

## 2021-04-06 NOTE — TELEPHONE ENCOUNTER
----- Message from Yoly Landers MD sent at 4/6/2021  8:04 AM CDT -----  Please notify patient labs look great except vitamin D is quite low  This is very common.   Take prescription ergocalciferol 50,000 IU qweek x 12 weeks then repeat Vit D 25-OH in 12-13

## 2021-04-06 NOTE — TELEPHONE ENCOUNTER
Spoke with patient and let her know labs were good but vitamin D is low and a rx is ordered to be taken once a week for 12 weeks and re check vitamin D.

## 2021-05-03 ENCOUNTER — TELEPHONE (OUTPATIENT)
Dept: FAMILY MEDICINE CLINIC | Facility: CLINIC | Age: 76
End: 2021-05-03

## 2021-05-03 NOTE — TELEPHONE ENCOUNTER
Overdue result letter mailed to patient   Lab Frequency Next Occurrence   [facility] SCREENING BILAT (CPT=77067) Once 04/02/2021

## 2021-06-29 ENCOUNTER — TELEPHONE (OUTPATIENT)
Dept: FAMILY MEDICINE CLINIC | Facility: CLINIC | Age: 76
End: 2021-06-29

## 2021-06-29 DIAGNOSIS — E55.9 VITAMIN D DEFICIENCY: Primary | ICD-10-CM

## 2021-06-29 RX ORDER — ERGOCALCIFEROL 1.25 MG/1
50000 CAPSULE ORAL WEEKLY
Qty: 4 CAPSULE | Refills: 0 | OUTPATIENT
Start: 2021-06-29 | End: 2021-09-15

## 2021-06-29 NOTE — TELEPHONE ENCOUNTER
SHABANA Quevedo Nurse  Patient is due for repeat Vitamin D in 12-13 weeks. Letter mailed to patient reminding her she is due for labs.     Lab Frequency Next Occurrence   VITAMIN D, 25-HYDROXY Once 06/29/2021

## 2021-08-09 ENCOUNTER — TELEPHONE (OUTPATIENT)
Dept: FAMILY MEDICINE CLINIC | Facility: CLINIC | Age: 76
End: 2021-08-09

## 2021-08-12 ENCOUNTER — MED REC SCAN ONLY (OUTPATIENT)
Dept: FAMILY MEDICINE CLINIC | Facility: CLINIC | Age: 76
End: 2021-08-12

## 2022-03-11 ENCOUNTER — TELEPHONE (OUTPATIENT)
Dept: FAMILY MEDICINE CLINIC | Facility: CLINIC | Age: 77
End: 2022-03-11

## 2022-03-14 ENCOUNTER — TELEPHONE (OUTPATIENT)
Dept: FAMILY MEDICINE CLINIC | Facility: CLINIC | Age: 77
End: 2022-03-14

## 2022-03-14 NOTE — TELEPHONE ENCOUNTER
Discussed with Dr. Perez Re regarding note below - please schedule for next week - 20 mins okay - ok to double book during well child visit    Routing to  to schedule.  Thank you

## 2022-03-14 NOTE — TELEPHONE ENCOUNTER
Pt called she is having pain in her leg/groin area. Pt said that is comes and goes. Pain is at about 3 or 4 out of 10. Can we fit pt in somewhere for an appointment ?

## 2022-03-14 NOTE — TELEPHONE ENCOUNTER
Pt reports started long time ago - years ago    Once in a while will get - left leg/groin area pain - top of leg crease    C/o Discomfort/pain    Comes and goes in minutes/hours    Pain score 3-4/10  Pt concerned today - pain has been constant and not going away    Has not taken OTC meds. Looking for recommendations / appt?     Please advise, thank you    Pt okay to receive call back tomorrow

## 2022-04-12 ENCOUNTER — TELEPHONE (OUTPATIENT)
Dept: FAMILY MEDICINE CLINIC | Facility: CLINIC | Age: 77
End: 2022-04-12

## 2022-04-12 PROCEDURE — 81003 URINALYSIS AUTO W/O SCOPE: CPT | Performed by: FAMILY MEDICINE

## 2022-04-12 NOTE — TELEPHONE ENCOUNTER
Patient has not been yet seen by Dr. Santana Neither. Believes she may have had Norovirus, was having watery stool. Denies any recent antibiotic therapy. States last episode of diarrhea was yesterday. None as of today. States UTI symptoms began yesterday. Wanted to know if Dr. Esther Russo could call something in for her. Advised patient that Dr. Santana Neither would like to see her prior to ordering anything. Patient verbalized understanding. Future Appointments   Date Time Provider Marcella Jimenez   4/15/2022 10:40 AM Arlen Arreaga MD [facility] [place]     Please advise if any further intervention needed. Patient reports has been pushing water and pedialyte to counteract any dehydration.

## 2022-04-12 NOTE — TELEPHONE ENCOUNTER
Pt called she has a uti . Her symptoms are pressure and when urinating its not a lot. Pt wants to know if the dr would call something in for her?

## 2022-04-13 ENCOUNTER — NURSE ONLY (OUTPATIENT)
Dept: FAMILY MEDICINE CLINIC | Facility: CLINIC | Age: 77
End: 2022-04-13
Payer: MEDICARE

## 2022-04-13 ENCOUNTER — TELEPHONE (OUTPATIENT)
Dept: FAMILY MEDICINE CLINIC | Facility: CLINIC | Age: 77
End: 2022-04-13

## 2022-04-13 DIAGNOSIS — N39.0 URINARY TRACT INFECTION WITHOUT HEMATURIA, SITE UNSPECIFIED: ICD-10-CM

## 2022-04-13 LAB
BILIRUBIN: NEGATIVE
GLUCOSE (URINE DIPSTICK): NEGATIVE MG/DL
KETONES (URINE DIPSTICK): NEGATIVE MG/DL
MULTISTIX LOT#: ABNORMAL NUMERIC
NITRITE, URINE: POSITIVE
PH, URINE: 6 (ref 4.5–8)
PROTEIN (URINE DIPSTICK): 30 MG/DL
SPECIFIC GRAVITY: 1.02 (ref 1–1.03)
UROBILINOGEN,SEMI-QN: 1 MG/DL (ref 0–1.9)

## 2022-04-13 PROCEDURE — 87086 URINE CULTURE/COLONY COUNT: CPT | Performed by: FAMILY MEDICINE

## 2022-04-13 PROCEDURE — 87186 SC STD MICRODIL/AGAR DIL: CPT | Performed by: FAMILY MEDICINE

## 2022-04-13 PROCEDURE — 87077 CULTURE AEROBIC IDENTIFY: CPT | Performed by: FAMILY MEDICINE

## 2022-04-13 RX ORDER — NITROFURANTOIN 25; 75 MG/1; MG/1
100 CAPSULE ORAL 2 TIMES DAILY
Qty: 14 CAPSULE | Refills: 0 | Status: SHIPPED | OUTPATIENT
Start: 2022-04-13 | End: 2022-04-20

## 2022-04-13 NOTE — TELEPHONE ENCOUNTER
Agree with plan as above. Start taking probiotics - OTC Culturelle / Florastor   If able to come in and give us a urine sample tomorrow 4/13/2022, to perform urine dip and send out urine Cx, this would help with the visit on Friday. Thank you.

## 2022-04-13 NOTE — TELEPHONE ENCOUNTER
Discussed with Dr. Espinoza Min - urine dip results    Please send Rx Macrobid 100 mg BID, for 7 days  emphasize probiotics   Will see pt at future appt    Future Appointments   Date Time Provider Marcella Jimenez   4/15/2022 10:40 AM Brennon Matute MD [facility]

## 2022-04-13 NOTE — TELEPHONE ENCOUNTER
Patient advised of Doctor's note below. Patient verbalized understanding. No further questions at this time.     Future Appointments   Date Time Provider Marcella Jimenez   4/13/2022 10:00 AM [facility] SOCORRO ROMERO [facility]   4/15/2022 10:40 AM MD HEATHER Villatoro [facility]ole

## 2022-04-13 NOTE — PROGRESS NOTES
Roslyn Ricketts present in office for nurse visit. Pt instructed on clean catch urine  Urine sample obtained     All questions/concerns addressed. Patient left in stable condition.

## 2022-04-15 ENCOUNTER — OFFICE VISIT (OUTPATIENT)
Dept: FAMILY MEDICINE CLINIC | Facility: CLINIC | Age: 77
End: 2022-04-15
Payer: MEDICARE

## 2022-04-15 VITALS
WEIGHT: 238 LBS | DIASTOLIC BLOOD PRESSURE: 78 MMHG | OXYGEN SATURATION: 98 % | RESPIRATION RATE: 16 BRPM | BODY MASS INDEX: 42.17 KG/M2 | HEIGHT: 63 IN | SYSTOLIC BLOOD PRESSURE: 128 MMHG | TEMPERATURE: 98 F | HEART RATE: 83 BPM

## 2022-04-15 DIAGNOSIS — Z12.11 COLON CANCER SCREENING: ICD-10-CM

## 2022-04-15 DIAGNOSIS — R19.7 DIARRHEA OF PRESUMED INFECTIOUS ORIGIN: ICD-10-CM

## 2022-04-15 DIAGNOSIS — N30.01 ACUTE CYSTITIS WITH HEMATURIA: ICD-10-CM

## 2022-04-15 DIAGNOSIS — Z12.31 ENCOUNTER FOR SCREENING MAMMOGRAM FOR MALIGNANT NEOPLASM OF BREAST: ICD-10-CM

## 2022-04-15 DIAGNOSIS — Z80.3 FAMILY HISTORY OF BREAST CANCER: Primary | ICD-10-CM

## 2022-04-15 DIAGNOSIS — K21.9 GASTROESOPHAGEAL REFLUX DISEASE WITHOUT ESOPHAGITIS: ICD-10-CM

## 2022-04-15 PROCEDURE — 99213 OFFICE O/P EST LOW 20 MIN: CPT | Performed by: FAMILY MEDICINE

## 2022-05-13 ENCOUNTER — TELEPHONE (OUTPATIENT)
Dept: FAMILY MEDICINE CLINIC | Facility: CLINIC | Age: 77
End: 2022-05-13

## 2022-05-13 NOTE — TELEPHONE ENCOUNTER
Pt called she said that her leg is sore and very painful. She was up all night and its going up into her hip. Can dr fit her in today? Or what does dr recommend to do?

## 2022-05-13 NOTE — TELEPHONE ENCOUNTER
Pain to left knee area - out of nowhere - denies known injury    Pain goes up to thigh sometimes    Last two nights - trouble sleeping due to pain    Describes as Gladis Marilu achey\" - w/ score 7/10    Ibuprofen - does help score 5-6/10    Pt requesting to be seen today Or recommendations    Please advise, thank you

## 2022-05-13 NOTE — TELEPHONE ENCOUNTER
Advised patient of Doctor's note below. Patient verbalized understanding. No further questions at this time.

## 2022-05-13 NOTE — TELEPHONE ENCOUNTER
Rest, ice, elevate. OTC Tylenol 500-650 mg every 4-6 hours (no more than 4 Gm in a 24 hour period)  If symptoms continue will see her in the office next week.    If any worsening over the next 2-3 days go to the IC

## 2022-05-13 NOTE — TELEPHONE ENCOUNTER
Left message to voicemail (per verbal release form consent, confirmed with identifying message.) Patient advised to call office back [phone].

## 2022-05-13 NOTE — TELEPHONE ENCOUNTER
PT CALLED AND THAT SHE IS NOT ABLE TO SLEEP WITH THE PAIN.  WAS WONDERING IF SOMETHING CAN BE CALLED IN FOR THE PAIN.    Jhoan Bojorquez

## 2022-05-17 ENCOUNTER — TELEPHONE (OUTPATIENT)
Dept: FAMILY MEDICINE CLINIC | Facility: CLINIC | Age: 77
End: 2022-05-17

## 2022-05-17 ENCOUNTER — PATIENT OUTREACH (OUTPATIENT)
Dept: FAMILY MEDICINE CLINIC | Facility: CLINIC | Age: 77
End: 2022-05-17

## 2022-07-05 ENCOUNTER — TELEPHONE (OUTPATIENT)
Dept: FAMILY MEDICINE CLINIC | Facility: CLINIC | Age: 77
End: 2022-07-05

## 2022-08-08 ENCOUNTER — TELEPHONE (OUTPATIENT)
Dept: FAMILY MEDICINE CLINIC | Facility: CLINIC | Age: 77
End: 2022-08-08

## 2022-09-20 LAB
AMB EXT CHOLESTEROL, TOTAL: 117 MG/DL
AMB EXT HDL CHOLESTEROL: 45 MG/DL
AMB EXT LDL CHOLESTEROL, DIRECT: 57 MG/DL
AMB EXT TRIGLYCERIDES: 80 MG/DL

## 2022-09-21 ENCOUNTER — TELEPHONE (OUTPATIENT)
Dept: FAMILY MEDICINE CLINIC | Facility: CLINIC | Age: 77
End: 2022-09-21

## 2022-10-05 ENCOUNTER — TELEPHONE (OUTPATIENT)
Dept: FAMILY MEDICINE CLINIC | Facility: CLINIC | Age: 77
End: 2022-10-05

## 2022-10-12 ENCOUNTER — TELEPHONE (OUTPATIENT)
Dept: FAMILY MEDICINE CLINIC | Facility: CLINIC | Age: 77
End: 2022-10-12

## 2022-11-16 ENCOUNTER — TELEPHONE (OUTPATIENT)
Dept: FAMILY MEDICINE CLINIC | Facility: CLINIC | Age: 77
End: 2022-11-16

## 2022-12-21 ENCOUNTER — TELEPHONE (OUTPATIENT)
Dept: FAMILY MEDICINE CLINIC | Facility: CLINIC | Age: 77
End: 2022-12-21

## 2022-12-21 RX ORDER — PREDNISONE 20 MG/1
20 TABLET ORAL DAILY
Qty: 5 TABLET | Refills: 0 | Status: SHIPPED | OUTPATIENT
Start: 2022-12-21 | End: 2022-12-26

## 2022-12-21 RX ORDER — PROMETHAZINE HYDROCHLORIDE AND CODEINE PHOSPHATE 6.25; 1 MG/5ML; MG/5ML
SYRUP ORAL NIGHTLY PRN
Qty: 120 ML | Refills: 0 | Status: SHIPPED | OUTPATIENT
Start: 2022-12-21 | End: 2022-12-31

## 2022-12-21 NOTE — TELEPHONE ENCOUNTER
LOV 04/15/2022 with Dr. Victorino Coffey    Pt reports symptoms started 2 night ago - mild - getting worse at night    Pt c/o cough, headaches - advil helps  head stuffy, congestion  No fevers, no chills    Pt reports her dtr started with same symptoms - got symptoms from her - dtr negative covid  Advised pt to take home covid test if possible - she v/u    Pt taking nyquil OTC cold flu - helps with sleep    Pt denies shortness of breath    Pt reports she was prescribed steroids in past by Dr. Joyce Seo - gets this about once a year    Pt requesting Rx for cough medicine and steroids    Advised pt that Dr. Victorino Coffey not in office - will have covering provider address - she v/u    Please advise, thank you    Last refill on 04/14/2020, for #5 tabs, with 0 refills  predniSONE 20 MG Oral Tab    Last refill on 11/13/2019, for #120mL, with 0 refills  promethazine-codeine 6.25-10 MG/5ML Oral Syrup

## 2022-12-21 NOTE — TELEPHONE ENCOUNTER
Pt would like a prescription for her cough. Wants cough medicine and steroids. Cough (productive - clear phlegm), headache, dizzy, runny nose. Hasn't taken a covid test.  Daughter had same symptoms. Please advise.  [id number] - [place] - [address] [facility], [phone]   [address] [place] [facility] [address]   Phone: [phone] Fax: [phone]     Thank you!

## 2023-03-22 ENCOUNTER — TELEPHONE (OUTPATIENT)
Dept: FAMILY MEDICINE CLINIC | Facility: CLINIC | Age: 78
End: 2023-03-22

## 2023-03-22 DIAGNOSIS — G47.30 SLEEP APNEA, UNSPECIFIED TYPE: Primary | ICD-10-CM

## 2023-03-22 NOTE — TELEPHONE ENCOUNTER
Pt was told by cardiologist that she may have sleep apnea and needs her PCP to place a referral for her to have a sleep test.  Please advise. Thank you!

## 2023-03-24 NOTE — TELEPHONE ENCOUNTER
Spoke with the pt and she states that she didn't end up needing a referral and she made the appt already but thanked me for calling her

## 2023-04-17 ENCOUNTER — OFFICE VISIT (OUTPATIENT)
Dept: FAMILY MEDICINE CLINIC | Facility: CLINIC | Age: 78
End: 2023-04-17
Payer: MEDICARE

## 2023-04-17 VITALS
TEMPERATURE: 97 F | SYSTOLIC BLOOD PRESSURE: 112 MMHG | HEART RATE: 81 BPM | DIASTOLIC BLOOD PRESSURE: 80 MMHG | WEIGHT: 239.63 LBS | HEIGHT: 62.5 IN | BODY MASS INDEX: 42.99 KG/M2

## 2023-04-17 DIAGNOSIS — L98.9 CHANGING SKIN LESION: ICD-10-CM

## 2023-04-17 DIAGNOSIS — L98.9 FACIAL SKIN LESION: Primary | ICD-10-CM

## 2023-04-17 PROBLEM — I48.0 PAROXYSMAL ATRIAL FIBRILLATION (HCC): Status: ACTIVE | Noted: 2023-04-17

## 2023-04-17 PROBLEM — I27.20 PULMONARY HYPERTENSION (HCC): Status: ACTIVE | Noted: 2023-04-17

## 2023-04-17 RX ORDER — DIGOXIN 125 MCG
125 TABLET ORAL DAILY
COMMUNITY
Start: 2023-04-11

## 2023-04-17 RX ORDER — FUROSEMIDE 20 MG/1
20 TABLET ORAL DAILY
COMMUNITY
Start: 2023-03-23

## 2023-04-19 ENCOUNTER — TELEPHONE (OUTPATIENT)
Dept: FAMILY MEDICINE CLINIC | Facility: CLINIC | Age: 78
End: 2023-04-19

## 2023-04-25 ENCOUNTER — TELEPHONE (OUTPATIENT)
Dept: FAMILY MEDICINE CLINIC | Facility: CLINIC | Age: 78
End: 2023-04-25

## 2023-04-25 NOTE — TELEPHONE ENCOUNTER
LM-This [facility] with Dr. Shaun Kohler with sleep medicine. We see you were referred by Dr Santos Payan for sleep apnea. We are calling to see if you would like to be seen with our office. If so, please give us a SAQU([phone]) to schedule a consult.

## 2023-04-25 NOTE — TELEPHONE ENCOUNTER
Alexus Peter MD  [facility]  Schedule Sleep Consult please.    Thank you,   Dr. Nanette Geiger

## 2023-05-02 ENCOUNTER — TELEPHONE (OUTPATIENT)
Dept: FAMILY MEDICINE CLINIC | Facility: CLINIC | Age: 78
End: 2023-05-02

## 2023-05-02 NOTE — TELEPHONE ENCOUNTER
Daughter called pt was walking up the stairs- her foot turned out, hit her head last nigh ton the concerte    She is bruised on her head and left srist, bruised on her face and her swollen and there is a pretty nigh yoseph kraft.    Pt did take advil 400mg every 4 hours, atorvastatin, metoprolol and her eliquis. RN Spoke with Dr. Mila Montana covering provider and he advised the pt should be seen at ER to be evaulated.     Daughter was provided with this information and verbalized understanding- will take monther to ER

## 2023-05-11 ENCOUNTER — MED REC SCAN ONLY (OUTPATIENT)
Dept: FAMILY MEDICINE CLINIC | Facility: CLINIC | Age: 78
End: 2023-05-11

## 2023-06-07 ENCOUNTER — MED REC SCAN ONLY (OUTPATIENT)
Dept: FAMILY MEDICINE CLINIC | Facility: CLINIC | Age: 78
End: 2023-06-07

## 2023-07-13 ENCOUNTER — MED REC SCAN ONLY (OUTPATIENT)
Dept: FAMILY MEDICINE CLINIC | Facility: CLINIC | Age: 78
End: 2023-07-13

## 2023-08-23 ENCOUNTER — MED REC SCAN ONLY (OUTPATIENT)
Dept: FAMILY MEDICINE CLINIC | Facility: CLINIC | Age: 78
End: 2023-08-23

## 2023-09-27 ENCOUNTER — TELEPHONE (OUTPATIENT)
Dept: FAMILY MEDICINE CLINIC | Facility: CLINIC | Age: 78
End: 2023-09-27

## 2023-09-27 NOTE — TELEPHONE ENCOUNTER
Left detailed message to voicemail (per verbal release form consent with confirmed identifying message) of APRN's recommendation below. Patient was advised to go to ER for eval and to call office back with any questions/concerns.

## 2023-09-27 NOTE — TELEPHONE ENCOUNTER
Called pt - pt reports was carrying groceries - foot caught on step - fell on wrist and head - has bump on head    Fall happened 05/01/23 - next morning went to ER 05/02/23 - MRI was fine    Pt reports still has a bump there - unsure if went away and now came back  No pain, but about 1.5 week ago - can't explain feeling, but above bump on head - bump to side of eye/cheek/towards ear to left side    Pt requesting appt to discuss - will keep appt    Future Appointments   Date Time Provider Marcella Jimenez   9/28/2023  1:40 PM FE Frausto [facility]     Discussed with [address] [place] [address] regarding note above - ok to keep appt for tomorrow

## 2023-09-27 NOTE — TELEPHONE ENCOUNTER
Patient scheduled appt with me tomorrow for:    FELL ON HEAD-NO PAIN BUT FEELS SOMETHING THERE-SOMETHING DEVELOPING       Please call and triage. She is on blood thinners and likely needs head CT. Probably needs ER evaluation today    Also sending to manager to review.  No note was sent back to triage when appt made

## 2023-09-28 ENCOUNTER — TELEPHONE (OUTPATIENT)
Dept: FAMILY MEDICINE CLINIC | Facility: CLINIC | Age: 78
End: 2023-09-28

## 2023-09-28 ENCOUNTER — HOSPITAL ENCOUNTER (OUTPATIENT)
Dept: GENERAL RADIOLOGY | Age: 78
Discharge: HOME | End: 2023-09-28
Attending: NURSE PRACTITIONER
Payer: MEDICARE

## 2023-09-28 ENCOUNTER — OFFICE VISIT (OUTPATIENT)
Dept: FAMILY MEDICINE CLINIC | Facility: CLINIC | Age: 78
End: 2023-09-28
Payer: MEDICARE

## 2023-09-28 VITALS
BODY MASS INDEX: 43 KG/M2 | WEIGHT: 238 LBS | DIASTOLIC BLOOD PRESSURE: 76 MMHG | OXYGEN SATURATION: 98 % | HEART RATE: 113 BPM | SYSTOLIC BLOOD PRESSURE: 136 MMHG | TEMPERATURE: 98 F
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
## 2023-09-28 DIAGNOSIS — M95.2 ACQUIRED FACIAL DEFORMITY: ICD-10-CM

## 2023-09-28 DIAGNOSIS — Z23 NEED FOR VACCINATION: ICD-10-CM

## 2023-09-28 DIAGNOSIS — M95.2 ACQUIRED FACIAL DEFORMITY: Primary | ICD-10-CM

## 2023-09-28 PROCEDURE — G0008 ADMIN INFLUENZA VIRUS VAC: HCPCS | Performed by: NURSE PRACTITIONER

## 2023-09-28 PROCEDURE — 99213 OFFICE O/P EST LOW 20 MIN: CPT | Performed by: NURSE PRACTITIONER

## 2023-09-28 PROCEDURE — 70150 X-RAY EXAM OF FACIAL BONES: CPT | Performed by: NURSE PRACTITIONER

## 2023-09-28 PROCEDURE — 90686 IIV4 VACC NO PRSV 0.5 ML IM: CPT | Performed by: NURSE PRACTITIONER

## 2023-09-28 RX ORDER — METOPROLOL TARTRATE 100 MG/1
100 TABLET ORAL 2 TIMES DAILY
COMMUNITY
Start: 2023-08-06

## 2023-09-28 NOTE — TELEPHONE ENCOUNTER
----- Message from FE Bertrand sent at 9/28/2023  3:34 PM CDT -----  Results reviewed.  No bony abnormality noted on xray

## 2023-09-28 NOTE — TELEPHONE ENCOUNTER
Patient requesting regular dose flu vaccine, not the high dose flu vaccine. Spoke with Jessy Tejada and advised ok to give regular dose flu vaccine.    Consent sent to scan  Tolerated well  Left office in stable condition

## 2023-10-11 ENCOUNTER — TELEPHONE (OUTPATIENT)
Dept: FAMILY MEDICINE CLINIC | Facility: CLINIC | Age: 78
End: 2023-10-11

## 2023-11-02 NOTE — TELEPHONE ENCOUNTER
Left message detail message per (HIPPA form) with results and recommendation.  Patient to call back if they have any questions O-Z Flap Text: The defect edges were debeveled with a #15 scalpel blade.  Given the location of the defect, shape of the defect and the proximity to free margins an O-Z flap was deemed most appropriate.  Using a sterile surgical marker, an appropriate transposition flap was drawn incorporating the defect and placing the expected incisions within the relaxed skin tension lines where possible. The area thus outlined was incised deep to adipose tissue with a #15 scalpel blade.  The skin margins were undermined to an appropriate distance in all directions utilizing iris scissors.

## 2024-01-22 ENCOUNTER — TELEPHONE (OUTPATIENT)
Dept: FAMILY MEDICINE CLINIC | Facility: CLINIC | Age: 79
End: 2024-01-22

## 2024-04-18 ENCOUNTER — MED REC SCAN ONLY (OUTPATIENT)
Dept: FAMILY MEDICINE CLINIC | Facility: CLINIC | Age: 79
End: 2024-04-18

## 2024-08-05 ENCOUNTER — OFFICE VISIT (OUTPATIENT)
Dept: FAMILY MEDICINE CLINIC | Facility: CLINIC | Age: 79
End: 2024-08-05
Payer: MEDICARE

## 2024-08-05 VITALS
RESPIRATION RATE: 18 BRPM | HEIGHT: 63 IN | HEART RATE: 96 BPM | TEMPERATURE: 97 F | SYSTOLIC BLOOD PRESSURE: 130 MMHG | DIASTOLIC BLOOD PRESSURE: 84 MMHG | OXYGEN SATURATION: 98 % | BODY MASS INDEX: 40.75 KG/M2 | WEIGHT: 230 LBS

## 2024-08-05 DIAGNOSIS — M25.562 CHRONIC PAIN OF LEFT KNEE: ICD-10-CM

## 2024-08-05 DIAGNOSIS — R73.9 ELEVATED BLOOD SUGAR: ICD-10-CM

## 2024-08-05 DIAGNOSIS — R77.1 ELEVATED SERUM GLOBULIN LEVEL: Primary | ICD-10-CM

## 2024-08-05 DIAGNOSIS — L29.9 PRURITUS: ICD-10-CM

## 2024-08-05 DIAGNOSIS — G89.29 CHRONIC PAIN OF LEFT KNEE: ICD-10-CM

## 2024-08-05 DIAGNOSIS — I10 BENIGN ESSENTIAL HTN: ICD-10-CM

## 2024-08-05 PROBLEM — I48.11 LONGSTANDING PERSISTENT ATRIAL FIBRILLATION (HCC): Status: ACTIVE | Noted: 2024-05-17

## 2024-08-05 LAB
ALBUMIN SERPL-MCNC: 4.2 G/DL (ref 3.2–4.8)
ALBUMIN/GLOB SERPL: 1.2 {RATIO} (ref 1–2)
ALP LIVER SERPL-CCNC: 96 U/L
ALT SERPL-CCNC: 25 U/L
ANION GAP SERPL CALC-SCNC: 3 MMOL/L (ref 0–18)
AST SERPL-CCNC: 21 U/L (ref ?–34)
BASOPHILS # BLD AUTO: 0.05 X10(3) UL (ref 0–0.2)
BASOPHILS NFR BLD AUTO: 0.7 %
BILIRUB SERPL-MCNC: 0.8 MG/DL (ref 0.2–1.1)
BUN BLD-MCNC: 15 MG/DL (ref 9–23)
CALCIUM BLD-MCNC: 9.5 MG/DL (ref 8.7–10.4)
CHLORIDE SERPL-SCNC: 105 MMOL/L (ref 98–112)
CO2 SERPL-SCNC: 30 MMOL/L (ref 21–32)
CREAT BLD-MCNC: 0.67 MG/DL
CREAT UR-SCNC: 121.6 MG/DL
EGFRCR SERPLBLD CKD-EPI 2021: 91 ML/MIN/1.73M2 (ref 60–?)
EOSINOPHIL # BLD AUTO: 0.18 X10(3) UL (ref 0–0.7)
EOSINOPHIL NFR BLD AUTO: 2.7 %
ERYTHROCYTE [DISTWIDTH] IN BLOOD BY AUTOMATED COUNT: 15.9 %
EST. AVERAGE GLUCOSE BLD GHB EST-MCNC: 134 MG/DL (ref 68–126)
FASTING STATUS PATIENT QL REPORTED: YES
FOLATE SERPL-MCNC: 18.9 NG/ML (ref 5.4–?)
GLOBULIN PLAS-MCNC: 3.4 G/DL (ref 2–3.5)
GLUCOSE BLD-MCNC: 96 MG/DL (ref 70–99)
HBA1C MFR BLD: 6.3 % (ref ?–5.7)
HCT VFR BLD AUTO: 46.3 %
HGB BLD-MCNC: 14.8 G/DL
IMM GRANULOCYTES # BLD AUTO: 0.03 X10(3) UL (ref 0–1)
IMM GRANULOCYTES NFR BLD: 0.4 %
LYMPHOCYTES # BLD AUTO: 1.74 X10(3) UL (ref 1–4)
LYMPHOCYTES NFR BLD AUTO: 26 %
MCH RBC QN AUTO: 29.2 PG (ref 26–34)
MCHC RBC AUTO-ENTMCNC: 32 G/DL (ref 31–37)
MCV RBC AUTO: 91.3 FL
MICROALBUMIN UR-MCNC: 0.9 MG/DL
MICROALBUMIN/CREAT 24H UR-RTO: 7.4 UG/MG (ref ?–30)
MONOCYTES # BLD AUTO: 0.43 X10(3) UL (ref 0.1–1)
MONOCYTES NFR BLD AUTO: 6.4 %
NEUTROPHILS # BLD AUTO: 4.26 X10 (3) UL (ref 1.5–7.7)
NEUTROPHILS # BLD AUTO: 4.26 X10(3) UL (ref 1.5–7.7)
NEUTROPHILS NFR BLD AUTO: 63.8 %
OSMOLALITY SERPL CALC.SUM OF ELEC: 287 MOSM/KG (ref 275–295)
PLATELET # BLD AUTO: 243 10(3)UL (ref 150–450)
POTASSIUM SERPL-SCNC: 4.1 MMOL/L (ref 3.5–5.1)
PROT SERPL-MCNC: 7.6 G/DL (ref 5.7–8.2)
RBC # BLD AUTO: 5.07 X10(6)UL
SODIUM SERPL-SCNC: 138 MMOL/L (ref 136–145)
VIT B12 SERPL-MCNC: 1485 PG/ML (ref 211–911)
WBC # BLD AUTO: 6.7 X10(3) UL (ref 4–11)

## 2024-08-05 PROCEDURE — 82784 ASSAY IGA/IGD/IGG/IGM EACH: CPT | Performed by: FAMILY MEDICINE

## 2024-08-05 PROCEDURE — 99214 OFFICE O/P EST MOD 30 MIN: CPT | Performed by: FAMILY MEDICINE

## 2024-08-05 PROCEDURE — 84165 PROTEIN E-PHORESIS SERUM: CPT | Performed by: FAMILY MEDICINE

## 2024-08-05 PROCEDURE — 82607 VITAMIN B-12: CPT | Performed by: FAMILY MEDICINE

## 2024-08-05 PROCEDURE — 83036 HEMOGLOBIN GLYCOSYLATED A1C: CPT | Performed by: FAMILY MEDICINE

## 2024-08-05 PROCEDURE — 82746 ASSAY OF FOLIC ACID SERUM: CPT | Performed by: FAMILY MEDICINE

## 2024-08-05 PROCEDURE — 82570 ASSAY OF URINE CREATININE: CPT | Performed by: FAMILY MEDICINE

## 2024-08-05 PROCEDURE — 85025 COMPLETE CBC W/AUTO DIFF WBC: CPT | Performed by: FAMILY MEDICINE

## 2024-08-05 PROCEDURE — 86334 IMMUNOFIX E-PHORESIS SERUM: CPT | Performed by: FAMILY MEDICINE

## 2024-08-05 PROCEDURE — 80053 COMPREHEN METABOLIC PANEL: CPT | Performed by: FAMILY MEDICINE

## 2024-08-05 PROCEDURE — 82043 UR ALBUMIN QUANTITATIVE: CPT | Performed by: FAMILY MEDICINE

## 2024-08-05 RX ORDER — SOTALOL HYDROCHLORIDE 120 MG/1
120 TABLET ORAL EVERY 12 HOURS
COMMUNITY
Start: 2024-05-30 | End: 2024-08-05

## 2024-08-05 NOTE — PROGRESS NOTES
Chief Complaint   Patient presents with    Itchiness     Itchy back, been going on for a while    Leg Pain     Left leg pain         HPI  Pt /co back itching for the last 4 years and it is getting worse. She is also c/o left knee pain on medial aspect when hanging down but feels better when she elevates the leg. She has had this for several years too but worried she may have a blood clot. Review of prior labs indicate elevated globulin levels and increased RDW and with low MCHC. Pt on blood thinner. She was also noted to have elevated blood sugar    ROS  As per HPI and all other systems reviewed and are negative      Past Medical History:    CAD (coronary artery disease)    sees Dr. Clark cardiologist    Obesity, unspecified    Other and unspecified hyperlipidemia    Unspecified essential hypertension       Past Surgical History:   Procedure Laterality Date    Angioplasty (coronary)      stent placed by Dr. Clark          x3       Social History     Socioeconomic History    Marital status:    Tobacco Use    Smoking status: Former     Current packs/day: 0.00     Average packs/day: 1 pack/day for 50.0 years (50.0 ttl pk-yrs)     Types: Cigarettes     Start date: 1962     Quit date: 2012     Years since quittin.7     Passive exposure: Past    Smokeless tobacco: Never   Vaping Use    Vaping status: Never Used   Substance and Sexual Activity    Alcohol use: No     Alcohol/week: 0.0 standard drinks of alcohol     Comment: very seldom    Drug use: No       Family History   Problem Relation Age of Onset    Heart Disorder Father         pacemaker    Cancer Mother         leukemia    Cancer Sister         \"breast pre-cancer\"    Cancer Maternal Aunt         breast        Current Outpatient Medications on File Prior to Visit   Medication Sig Dispense Refill    metoprolol tartrate 100 MG Oral Tab Take 1 tablet (100 mg total) by mouth 2 (two) times daily.      digoxin 0.125 MG Oral Tab Take 1 tablet  (125 mcg total) by mouth daily.      apixaban (ELIQUIS) 5 MG Oral Tab Take 1 tablet (5 mg total) by mouth Q12H.      hydrochlorothiazide 12.5 MG Oral Tab Take 1 tablet (12.5 mg total) by mouth daily.      Irbesartan 300 MG Oral Tab Take 1 tablet (300 mg total) by mouth daily.      aspirin 81 MG Oral Tab Take 1 tablet (81 mg total) by mouth daily.      Atorvastatin Calcium 10 MG Oral Tab Take 1 tablet (10 mg total) by mouth nightly.       No current facility-administered medications on file prior to visit.         Objective  Vitals:    08/05/24 0850   BP: 130/84   Pulse: 96   Resp: 18   Temp: 97.4 °F (36.3 °C)   SpO2: 98%   Weight: 230 lb (104.3 kg)   Height: 5' 3\" (1.6 m)     Physical Exam  Constitutional:       Appearance: Normal appearance.   HEENT:      Head: Normocephalic and atraumatic.      Eyes: PERRLA no notable nystagmus     Ears: normal on observation     Nose: Nose normal.      Mouth: Mucous membranes are moist.      Neck: no masses no bruit  Cardiovascular:      Rate and Rhythm: Normal rate and regular rhythm.   Pulmonary:      Effort: Pulmonary effort is normal.      Breath sounds: Normal breath sounds.   Musculoskeletal:         General: Normal range of motion.      Cervical back: Normal range of motion.   Skin:     General: Skin is warm and dry. No lesion on back she has a wart on inner left lower thigh and her left lower leg is unremarkable but she has a little tenderness along the medial joint line. Suspect OA but pt refusing Xray and not worried as long as no blood clot  Neurological:      General: No focal deficit present.      Mental Status: She is alert and oriented to person, place, and time.   Psychiatric:         Mood and Affect: Mood normal.         Thought Content: Thought content normal.       Assessment and Plan  Dilma was seen today for itchiness and leg pain.    Diagnoses and all orders for this visit:    Elevated serum globulin level  -     Serum Protein Elect w/ reflex [E]; Future  -      CBC W Differential W Platelet [E]; Future  -     Serum Protein Elect w/ reflex [E]  -     CBC W Differential W Platelet [E]    Pruritus  -     CBC W Differential W Platelet [E]; Future  -     Comp Metabolic Panel (14) [E]; Future  -     Vitamin B12 [E]; Future  -     Folic Acid Serum [E]; Future  -     CBC W Differential W Platelet [E]  -     Comp Metabolic Panel (14) [E]  -     Vitamin B12 [E]  -     Folic Acid Serum [E]    Elevated blood sugar  -     Hemoglobin A1C [E]; Future  -     Hemoglobin A1C [E]    Benign essential HTN  -     Microalb/Creat Ratio, Random Urine [E]; Future  -     Microalb/Creat Ratio, Random Urine [E]    Chronic pain of left knee           Follow up  Return in about 2 weeks (around 8/19/2024) for MAW.      Patient Instructions  There are no Patient Instructions on file for this visit.       Grace Koenig MD

## 2024-08-06 DIAGNOSIS — R76.8 ELEVATED IMMUNOGLOBULIN A: Primary | ICD-10-CM

## 2024-08-06 LAB
IGA SERPL-MCNC: 470.9 MG/DL (ref 40–350)
IGM SERPL-MCNC: 136.3 MG/DL (ref 50–300)
IMMUNOGLOBULIN PNL SER-MCNC: 1443 MG/DL (ref 650–1600)

## 2024-08-08 LAB
ALBUMIN SERPL ELPH-MCNC: 3.48 G/DL (ref 3.75–5.21)
ALBUMIN/GLOB SERPL: 0.96 {RATIO} (ref 1–2)
ALPHA1 GLOB SERPL ELPH-MCNC: 0.31 G/DL (ref 0.19–0.46)
ALPHA2 GLOB SERPL ELPH-MCNC: 0.72 G/DL (ref 0.48–1.05)
B-GLOBULIN SERPL ELPH-MCNC: 1.31 G/DL (ref 0.68–1.23)
GAMMA GLOB SERPL ELPH-MCNC: 1.27 G/DL (ref 0.62–1.7)
PROT SERPL-MCNC: 7.1 G/DL (ref 5.7–8.2)

## 2024-08-09 ENCOUNTER — TELEPHONE (OUTPATIENT)
Dept: FAMILY MEDICINE CLINIC | Facility: CLINIC | Age: 79
End: 2024-08-09

## 2024-08-09 NOTE — TELEPHONE ENCOUNTER
----- Message from Grace Koenig sent at 2024  1:57 PM CDT -----  Pleas have pt follow up with hematologist to review lab.Thank you    Jessica Lopez MA  2024  3:33 PM CDT       I called the patient and she said she would call back tomorrow sometime 24 to get the referral information.    Jessica Lopez MA  2024  3:03 PM CDT       Patient's name and  verified  Patient notified and verbalized an understanding.     I notified the patient that I would call her back with the CMP results once they arrive.    Jessica Lopez MA  2024  3:03 PM CDT       I called and spoke with the patient and she said it is fine to put in that referral, please send it back to me once done so I can call the patient with the referral information. Thank you.    Grace Koenig MD  2024 11:43 AM CDT       Pt has elevated levels of IgA. I would like for her to see hematology for further mmgt. Please check if ok to place referral. Thank you    Grace Koenig MD  2024  6:46 PM CDT       Await CMP. Pt to decrease B12 intake

## 2024-08-09 NOTE — TELEPHONE ENCOUNTER
Provider Address Phone   [place] [address] [place] [address] [phone]

## 2024-08-16 NOTE — TELEPHONE ENCOUNTER
Left detailed message to voicemail (per verbal release form consent with confirmed identifying message) of referral contact info below. Patient was advised to call office back with any questions/concerns.

## 2024-09-03 ENCOUNTER — TELEPHONE (OUTPATIENT)
Dept: FAMILY MEDICINE CLINIC | Facility: CLINIC | Age: 79
End: 2024-09-03

## 2024-09-03 NOTE — TELEPHONE ENCOUNTER
Patient's name and  verified     Patient is having a Afib ablation done on Thursday and is not scheduled to see hematology yet.    Patient is worried to have the procedure due to her recent test results.      Please Advise

## 2024-09-04 NOTE — TELEPHONE ENCOUNTER
Note per Grace Koenig MD  She is ok to have this done as this procedure won't affect these labs and vice versa

## 2024-10-21 ENCOUNTER — TELEPHONE (OUTPATIENT)
Dept: FAMILY MEDICINE CLINIC | Facility: CLINIC | Age: 79
End: 2024-10-21

## 2024-12-03 ENCOUNTER — TELEPHONE (OUTPATIENT)
Dept: FAMILY MEDICINE CLINIC | Facility: CLINIC | Age: 79
End: 2024-12-03

## 2024-12-03 NOTE — TELEPHONE ENCOUNTER
Patient reports productive cough for past 2 days   Has been taking OTC mucinex for pts with high BP  No fevers    Advised patient of Dr. Reid's note below. Patient verbalized understanding.     Advised patient she may take covid test at home to rule out covid - she verbalized understanding and stated when she did have covid, she had hard time breathing, but currently does not have issues with breathing.   Advised patient to call office back if symptoms worsening - she verbalized understanding. No further questions at this time.

## 2024-12-03 NOTE — TELEPHONE ENCOUNTER
Left message to voicemail (per verbal release form consent, confirmed with identifying message.)  Advised patient to call office back [phone] - need to triage.    Awaiting call back from patient    Please see note below  Please advise, thank you

## 2024-12-03 NOTE — TELEPHONE ENCOUNTER
Awaiting call back from patient    Per Dr. Reid: Sinus rinse flonase and mucinex and if getting worse will need to be seen. If no change will need to wait 10 days

## 2024-12-03 NOTE — TELEPHONE ENCOUNTER
PT CALLED AND ADV HAS STARTED W/A REALLY BAD COUGH X 2 DAYS W/MUCUS    LOOKING TO SEE IF SOMETHING COULD BE CALLED IN    [facility]     THANK YOU

## 2024-12-10 ENCOUNTER — TELEPHONE (OUTPATIENT)
Dept: FAMILY MEDICINE CLINIC | Facility: CLINIC | Age: 79
End: 2024-12-10

## 2024-12-10 RX ORDER — NEBULIZER ACCESSORIES
KIT MISCELLANEOUS
Qty: 1 EACH | Refills: 0 | Status: SHIPPED | OUTPATIENT
Start: 2024-12-10

## 2024-12-10 RX ORDER — LEVALBUTEROL INHALATION SOLUTION 1.25 MG/3ML
1.25 SOLUTION RESPIRATORY (INHALATION) EVERY 6 HOURS PRN
Qty: 120 ML | Refills: 0 | Status: SHIPPED | OUTPATIENT
Start: 2024-12-10

## 2024-12-10 NOTE — TELEPHONE ENCOUNTER
Called and spoke with patient's dtr (ok per verbal release form consent) -   Dtr reports patient called PCP office last week - was advised to be seen if getting worse - dtr brought patient to UC following day and patient was sent to ER and admitted  (Please see admission encounters 12/04/24 and 12/07/24)    Dtr reports that patient discharged from hospital on Sunday    Dtr reports patient has pneumonia, still having trouble breathing  Patient currently sleeping, unable to come in to office any time soon    Patient's dtr reports she already called patient's cardiologists first (Dr. Clark's and Dr. Peña's office) - their RN advised to call PCP office    Dtr calling PCP office - requesting for possible neb treatment? Does not have neb machine at home.  Or patient was on inhaler years ago ?    If ok to send Rx, please send to [facility]  Please advise, thank you

## 2024-12-10 NOTE — TELEPHONE ENCOUNTER
Rx for nebulizer machine, tubing, mouthpiece also sent to pharmacy    Advised patient's dtr of Dr. Reid's note below and of note above. Patient's dtr verbalized understanding. No further questions at this time.

## 2024-12-10 NOTE — TELEPHONE ENCOUNTER
[id number] - [place], [place] - [address] [phone], [phone]   [address] [facility] [address]   Phone: [phone] Fax: [phone]   Hours: Not open 24 hours           ATIENT DAUGHTER IS CALLING THIS MORNING.  PATIENT WAS TAKEN TO [facility] AND ADMITTED.  PATIENT STILL HAVING TROUBLE BREATHING AT NIGHT.  PATIENT HAS PNEUMONIA.  HER DAUGHTER SUNNI IS ASKING IF DR WILHELM CAN PRESCRIBE NEBULIZER TREATMENT OR SOMETHING THAT WOULD HELP HER.  PATIENT IS SLEEPING RIGHT NOW.  PATIENT NOT ABLE TO COME TO OFFICE ANYTIME SOON.

## 2024-12-24 ENCOUNTER — TELEPHONE (OUTPATIENT)
Dept: FAMILY MEDICINE CLINIC | Facility: CLINIC | Age: 79
End: 2024-12-24

## 2024-12-24 NOTE — TELEPHONE ENCOUNTER
Called and spoke with patient - advised her of note below - she verbalized understanding and states she has a nebulizer at home    Patient reports completed abx couple days ago (was admitted for afib and pna), stopped nebs few days ago    Started to feel bad again - cough, wheezy - patient did neb treatment last night  Advised patient to continue neb treatments as needed every 6 hours - she verbalized understanding  Advised patient to monitor blood pressures at home as well - she verbalized understanding.    Advised patient to go to IC/ER for evaluation if symptoms worsens or does not improve - she verbalized understanding.  No further questions at this time

## 2024-12-24 NOTE — TELEPHONE ENCOUNTER
Received fax from [facility] regarding PA request for \"Comp Air Compressor Nebulizer\"    (Covermymeds: Key: [id number])    Need to inquire if patient able to  nebulizer or if needs PA to be completed

## 2025-01-07 ENCOUNTER — TELEPHONE (OUTPATIENT)
Dept: FAMILY MEDICINE CLINIC | Facility: CLINIC | Age: 80
End: 2025-01-07

## 2025-01-07 NOTE — TELEPHONE ENCOUNTER
Patient reports still with productive cough  Patient c/o short of breath with little exertion - patient unsure if combination of pna and afib    Does have upcoming cardiology appt - 01/13/25    No fevers  No chest pains, but patient c/o chest congestion    Patient reports wheezing, uses nebulizer treatment at night - helps a lot  Patient reports can do every 6 hours, but prefers to only use at night, sometimes does not use neb treatment if she feels she doesn't need it    Patient requesting appt to be seen  Unable to come tomorrow, requesting for appt for Saturday?    Advised patient will discuss with Dr. Reid - she verbalized understanding.    Ok to schedule in on Saturday per patient request?  Please advise, thank you

## 2025-01-07 NOTE — TELEPHONE ENCOUNTER
Patient still having issues following hospitalization for pneumonia    Requesting appt    Patient could not come today or tomorrow    Please adv  Thank you

## 2025-01-07 NOTE — TELEPHONE ENCOUNTER
Per Dr. Reid: She needs to be evaluated sooner than Saturday. Have her go to IC or see another provider if she is not able to come and see me    Advised patient of Dr. Reid's note above. Patient verbalized understanding and agreeable to see another provider tomorrow. Appt scheduled. No further questions at this time.    Future Appointments   Date Time Provider Department Center   1/8/2025 11:45 AM Darron Reid MD [facility] [place]     Routing as FYI only

## 2025-01-08 ENCOUNTER — OFFICE VISIT (OUTPATIENT)
Dept: FAMILY MEDICINE CLINIC | Facility: CLINIC | Age: 80
End: 2025-01-08
Payer: MEDICARE

## 2025-01-08 VITALS
BODY MASS INDEX: 41 KG/M2 | WEIGHT: 229 LBS | DIASTOLIC BLOOD PRESSURE: 74 MMHG | SYSTOLIC BLOOD PRESSURE: 122 MMHG | TEMPERATURE: 97 F | HEART RATE: 92 BPM | OXYGEN SATURATION: 96 %

## 2025-01-08 DIAGNOSIS — J18.9 PNEUMONIA DUE TO INFECTIOUS ORGANISM, UNSPECIFIED LATERALITY, UNSPECIFIED PART OF LUNG: Primary | ICD-10-CM

## 2025-01-08 DIAGNOSIS — I48.0 PAROXYSMAL ATRIAL FIBRILLATION (HCC): ICD-10-CM

## 2025-01-08 PROCEDURE — G2211 COMPLEX E/M VISIT ADD ON: HCPCS | Performed by: FAMILY MEDICINE

## 2025-01-08 PROCEDURE — 99213 OFFICE O/P EST LOW 20 MIN: CPT | Performed by: FAMILY MEDICINE

## 2025-01-08 RX ORDER — POTASSIUM CHLORIDE 1500 MG/1
TABLET, EXTENDED RELEASE ORAL
COMMUNITY
Start: 2024-09-10

## 2025-01-08 RX ORDER — NEBULIZER AND COMPRESSOR
EACH MISCELLANEOUS
COMMUNITY
Start: 2024-12-10

## 2025-01-08 RX ORDER — PREDNISONE 20 MG/1
TABLET ORAL
COMMUNITY
Start: 2024-09-05

## 2025-01-08 RX ORDER — DILTIAZEM HYDROCHLORIDE 120 MG/1
CAPSULE, COATED, EXTENDED RELEASE ORAL
COMMUNITY
Start: 2024-12-13

## 2025-01-08 RX ORDER — CEFUROXIME AXETIL 500 MG/1
TABLET ORAL
COMMUNITY
Start: 2024-12-08

## 2025-01-08 RX ORDER — DOXYCYCLINE 100 MG/1
CAPSULE ORAL
COMMUNITY
Start: 2024-12-08

## 2025-01-08 RX ORDER — FUROSEMIDE 20 MG/1
20 TABLET ORAL
COMMUNITY
Start: 2024-12-07

## 2025-01-08 RX ORDER — HYDROCODONE BITARTRATE AND ACETAMINOPHEN 5; 325 MG/1; MG/1
1 TABLET ORAL EVERY 6 HOURS PRN
COMMUNITY
Start: 2024-09-05

## 2025-01-08 RX ORDER — AMIODARONE HYDROCHLORIDE 200 MG/1
TABLET ORAL
COMMUNITY

## 2025-01-08 NOTE — PROGRESS NOTES
Dilma Maldonado is a 79 year old female.   Chief Complaint   Patient presents with    Follow - Up     HPI:    Time 9-year-old female comes in secondary to feeling a bit winded.  Patient states that she is recovering from pneumonia and also has a history of A-fib.  Patient states that ever since having her pneumonia she is having a tough time moving around.  Patient wanted to get checked to make sure that she was not getting any worse.  Patient has a visit with cardiology on the .  Past Medical History:    CAD (coronary artery disease)    sees Dr. Clark cardiologist    Obesity, unspecified    Other and unspecified hyperlipidemia    Unspecified essential hypertension     Past Surgical History:   Procedure Laterality Date    Angioplasty (coronary)      stent placed by Dr. lCark          x3     Family History   Problem Relation Age of Onset    Heart Disorder Father         pacemaker    Cancer Mother         leukemia    Cancer Sister         \"breast pre-cancer\"    Cancer Maternal Aunt         breast     Social History:  Social History     Socioeconomic History    Marital status:    Tobacco Use    Smoking status: Former     Current packs/day: 0.00     Average packs/day: 1 pack/day for 50.0 years (50.0 ttl pk-yrs)     Types: Cigarettes     Start date: 1962     Quit date: 2012     Years since quittin.1     Passive exposure: Past    Smokeless tobacco: Never   Vaping Use    Vaping status: Never Used   Substance and Sexual Activity    Alcohol use: No     Alcohol/week: 0.0 standard drinks of alcohol     Comment: very seldom    Drug use: No     Social Drivers of Health     Food Insecurity: No Food Insecurity (2024)    Received from [facility]    Food Insecurity     Currently or in the past 3 months, have you worried your food would run out before you had money to buy more?: No     In the past 12 months, have you run out of food or been unable to get more?: No    Transportation Needs: No Transportation Needs (12/4/2024)    Received from [facility]    Transportation Needs     Currently or in the past 3 months, has lack of transportation kept you from medical appointments, getting food or medicine, or providing care to a family member?: Unrecognized value     Medical Transportation Needs?: No    Received from [facility], [facility]    Social Connections    Received from [facility], [facility]    Housing Stability     Allergies:  Allergies[1]   Current Meds:  Current Outpatient Medications   Medication Sig Dispense Refill    amiodarone 200 MG Oral Tab Take 1 tablet (200 mg) by mouth every 12 hours      cefuroxime 500 MG Oral Tab Take 1 tablet (500 mg) by mouth every 12 hours for 10 days      doxycycline 100 MG Oral Cap Take 1 capsule (100 mg) by mouth every 12 hours for 10 days      furosemide 20 MG Oral Tab Take 1 tablet (20 mg total) by mouth.      HYDROcodone-acetaminophen 5-325 MG Oral Tab Take 1 tablet by mouth every 6 (six) hours as needed for Pain.      Nebulizers (COMP AIR COMPRESSOR NEBULIZER) Does not apply Misc Use with nebulizer solution - Take 3 mL (1.25 mg total) by nebulization every 6 (six) hours as needed for Wheezing      potassium chloride 20 MEQ Oral Tab CR Take 1 tablet (20 mEq) by mouth daily For 3 days while taking Lasix 40 mg. Swallow whole.      predniSONE 20 MG Oral Tab Take 2 tablets (40 mg) by mouth every morning with breakfast. Take with or after a meal.      Levalbuterol HCl (XOPENEX) 1.25 MG/3ML Inhalation Nebu Soln Take 3 mL (1.25 mg total) by nebulization every 6 (six) hours as needed for Wheezing. 120 mL 0    Respiratory Therapy Supplies (NEBULIZER/TUBING/MOUTHPIECE) Does not apply Kit Use with nebulizer solution - Take 3 mL (1.25 mg total) by nebulization every 6 (six) hours as needed for Wheezing 1 each 0    metoprolol tartrate 100 MG Oral  Tab Take 1 tablet (100 mg total) by mouth 2 (two) times daily.      digoxin 0.125 MG Oral Tab Take 1 tablet (125 mcg total) by mouth daily.      apixaban (ELIQUIS) 5 MG Oral Tab Take 1 tablet (5 mg total) by mouth Q12H.      hydrochlorothiazide 12.5 MG Oral Tab Take 1 tablet (12.5 mg total) by mouth daily.      Irbesartan 300 MG Oral Tab Take 1 tablet (300 mg total) by mouth daily.      aspirin 81 MG Oral Tab Take 1 tablet (81 mg total) by mouth daily.      Atorvastatin Calcium 10 MG Oral Tab Take 1 tablet (10 mg total) by mouth nightly.      dilTIAZem  MG Oral Capsule SR 24 Hr Take 1 capsule (120 mg) by mouth daily. Swallow whole. (Patient not taking: Reported on 1/8/2025)          ROS:   GENERAL HEALTH: feels well otherwise  SKIN: denies any unusual skin lesions or rashes  RESPIRATORY: denies shortness of breath with exertion  CARDIOVASCULAR: denies chest pain on exertion  GI: denies abdominal pain and denies heartburn  NEURO: denies headaches    PHYSICAL EXAM:   /74   Pulse 92   Temp 97 °F (36.1 °C) (Temporal)   Wt 229 lb (103.9 kg)   SpO2 96%   BMI 40.57 kg/m²   GENERAL HEALTH: well developed, well nourished, in no apparent distress  EYES: sclera anicteric, conjunctiva normal  HEENT: normocephalic; normal pharynx  NECK: supple; no JVD, no LAD  RESPIRATORY: clear to auscultation bilaterally, no tachypnea  CARDIOVASCULAR: S1, S2 irregularly irregular   EXTREMITIES: no cyanosis, clubbing or edema, peripheral pulses intact  PSYCHIATRIC: alert and oriented x 3; affect appropriate      ASSESSMENT/ PLAN:     Diagnoses and all orders for this visit:    Pneumonia due to infectious organism, unspecified laterality, unspecified part of lung    Paroxysmal atrial fibrillation (HCC)    Patient is currently in A-fib but is hemodynamically stable on her pulse oximetry is normal at 96%.  I do not believe that her shortness of breath is specifically due to her pneumonia.  I advised patient that recovery from what  I can take plenty weeks.  Make sure to keep her visit with her cardiology in place    The patient is to return to office in prn cards and PCP  The patient is to return to office for persistent or worsening signs and symptoms.   The proper use of medication and possible side effects discussed with patient.  An AVS was given to patient.  The patient verbalized understanding, agrees to treatment regimen and all questions were answered.        [1] No Known Allergies

## 2025-04-21 ENCOUNTER — TELEPHONE (OUTPATIENT)
Dept: FAMILY MEDICINE CLINIC | Facility: CLINIC | Age: 80
End: 2025-04-21

## 2025-04-21 NOTE — TELEPHONE ENCOUNTER
Per Dr. Reid: Yes (ok to schedule annual wellness and ER follow-up together)    Please schedule appt. Thank you

## 2025-04-21 NOTE — TELEPHONE ENCOUNTER
Advised patient of Dr. Reid's note below. Patient verbalized understanding.   Patient reports can do telephone call visit on Friday or can come in office this Saturday.  Advised patient will need to clarify with Dr. Reid if ok to schedule - she verbalized understanding. No further questions at this time.    Ok to schedule Telephone visit for Friday (patient unable to do video visit)?   Or schedule in office appt for this Saturday?   If scheduling for Saturday, ok to take SDA appt time 1145?    Please advise, thank you

## 2025-04-21 NOTE — TELEPHONE ENCOUNTER
Grace Koenig MD  P Grace Koenig Nurse  Needs follow up          Previous Messages       ----- Message -----  From: Beryl Lundy ([facility])  Sent: 4/16/2025   7:33 PM CDT  To: Grace Koenig MD ([facility])  Subject: Emergency Discharge on 4/16/2025

## 2025-04-21 NOTE — TELEPHONE ENCOUNTER
Per Dr. Reid: Needs to come in for annual wellness     Ok to schedule annual wellness and ER follow-up together?    Please advise, thank you

## 2025-04-26 ENCOUNTER — TELEPHONE (OUTPATIENT)
Dept: FAMILY MEDICINE CLINIC | Facility: CLINIC | Age: 80
End: 2025-04-26

## 2025-04-26 NOTE — TELEPHONE ENCOUNTER
I would recommend ER evaluation. She could have kidney stone and I would not recommend waiting that long

## 2025-04-26 NOTE — TELEPHONE ENCOUNTER
Patient was evaluated in   ER 4/16/25 for flank. Imaging was done.    Per mau Villela for f/u in office    Patient notified and rescheduled with Dr Reid

## 2025-04-26 NOTE — TELEPHONE ENCOUNTER
PATIENT CALLED TO SCHEDULE APPT FOR RIGHT SIDED FLANK PAIN. PATIENT HAS LIMITED AVAILABILITY DUE TO TRANSPORTATION ISSUES. PATIENT HAS BEEN SCHEDULED TO SEE ARIANNA ON 4/29/25 AT 9:20 AM as HER DAUGHTER IS ABLE TO DROP HER OFF AT THAT TIME. IS IT OK TO KEEP APPT WITH ARIANNA OR CAN DR. ISABEL WORK PATIENT IN?      PLEASE ADVISE.

## 2025-05-29 ENCOUNTER — TELEPHONE (OUTPATIENT)
Dept: FAMILY MEDICINE CLINIC | Facility: CLINIC | Age: 80
End: 2025-05-29

## 2025-05-29 NOTE — TELEPHONE ENCOUNTER
LVM to notify patient they are due for their Annual Wellness Visit. Provided office phone number requesting call back to assist with scheduling.

## 2025-08-19 ENCOUNTER — OFFICE VISIT (OUTPATIENT)
Dept: FAMILY MEDICINE CLINIC | Facility: CLINIC | Age: 80
End: 2025-08-19

## 2025-08-19 ENCOUNTER — HOSPITAL ENCOUNTER (OUTPATIENT)
Dept: GENERAL RADIOLOGY | Age: 80
Discharge: HOME | End: 2025-08-19
Attending: FAMILY MEDICINE

## 2025-08-19 VITALS
HEART RATE: 51 BPM | OXYGEN SATURATION: 95 % | RESPIRATION RATE: 16 BRPM | TEMPERATURE: 98 F | SYSTOLIC BLOOD PRESSURE: 104 MMHG | BODY MASS INDEX: 38.09 KG/M2 | DIASTOLIC BLOOD PRESSURE: 60 MMHG | HEIGHT: 63 IN | WEIGHT: 215 LBS

## 2025-08-19 DIAGNOSIS — G89.29 CHRONIC RIGHT SHOULDER PAIN: ICD-10-CM

## 2025-08-19 DIAGNOSIS — M25.511 CHRONIC RIGHT SHOULDER PAIN: ICD-10-CM

## 2025-08-19 DIAGNOSIS — M79.601 RIGHT ARM PAIN: ICD-10-CM

## 2025-08-19 DIAGNOSIS — M79.601 RIGHT ARM PAIN: Primary | ICD-10-CM

## 2025-08-19 DIAGNOSIS — I50.33 ACUTE ON CHRONIC DIASTOLIC HEART FAILURE (HCC): ICD-10-CM

## 2025-08-19 PROBLEM — E66.813 CLASS 3 SEVERE OBESITY WITH SERIOUS COMORBIDITY AND BODY MASS INDEX (BMI) OF 40.0 TO 44.9 IN ADULT: Status: RESOLVED | Noted: 2020-06-29 | Resolved: 2025-08-19

## 2025-08-19 PROCEDURE — 99214 OFFICE O/P EST MOD 30 MIN: CPT | Performed by: FAMILY MEDICINE

## 2025-08-19 PROCEDURE — 73030 X-RAY EXAM OF SHOULDER: CPT | Performed by: FAMILY MEDICINE

## 2025-08-19 PROCEDURE — 73060 X-RAY EXAM OF HUMERUS: CPT | Performed by: FAMILY MEDICINE

## (undated) DIAGNOSIS — N39.0 URINARY TRACT INFECTION WITHOUT HEMATURIA, SITE UNSPECIFIED: Primary | ICD-10-CM

## (undated) NOTE — LETTER
Date: 1/14/2020    Patient Name: Ena Valdez          To Whom it may concern: The above patient was seen at the [facility] for treatment of a medical condition. This patient should be excused from attending work 1/14/20.          Cecilia Portillo

## (undated) NOTE — LETTER
Yadira Justice [facility] [address]    6/29/2021      Dear  Princess Mena    In order to provide the highest quality care, RICHARD Victoria uses a sophisticated computer system to track our patient's re

## (undated) NOTE — LETTER
05/03/21          Mansi Maldonado   [address] [place] [address]           Dear Coby Conner records indicate that you have outstanding lab work and or testing that was ordered for you and has not yet been completed:  Lab Frequency Next

## (undated) NOTE — LETTER
11/29/18        Shayna Castro  [address] [place] [address]      Dear Amanda Garnett,    [address] [facility] records indicate that you have outstanding lab work and or testing that was ordered for you and has not yet been completed:  Lab Frequency Next Occurrence

## (undated) NOTE — LETTER
02/27/19        Alecia Alexander  [address] [place] [address]      Dear Harley Monroy,    [address] [facility] records indicate that you have outstanding lab work and or testing that was ordered for you and has not yet been completed:  Lab Frequency Next Occurrence

## (undated) NOTE — LETTER
Date: 2019    Patient Name: Maureen Hwang  : 1945      To Whom it may concern: This letter has been written at the patient's request. The above patient was seen at the [facility] for treatment of a medical condition.     This pa

## (undated) NOTE — MR AVS SNAPSHOT
[address] [address] [phone]               Thank you for choosing us for your health care visit with Jermaine Tamayo MD.  We are glad to serve you and happy to provide you with this sum please help parse out what's going on with her mentally and in her life to contribute to these symptoms. Thank you.       Referral Orders      Normal Orders This Visit    OP REFERRAL TO [facility] [[id number] CUSTOM]  Order #:  [id number] Your physician has referred you to a specialist.  Your physician or the clinic staff will provide you with the phone number you should call to schedule your appointment.      If you are confident that your benefit plan will not require a referral or authori Commonly known as:  TENORMIN           atorvastatin 10 MG Tabs   Take 10 mg by mouth nightly. Commonly known as:  LIPITOR           Cyclobenzaprine HCl 10 MG Tabs   Take 1 tablet (10 mg total) by mouth nightly.    Commonly known as:  cyclobenzaprine active are less likely to develop some chronic diseases than adults who are inactive.      HOW TO GET STARTED: HOW TO STAY MOTIVATED:   Start activities slowly and build up over time Do what you like   Get your heart pumping – brisk walking, biking, swimmin

## (undated) NOTE — LETTER
Date: 4/15/2022    Patient Name: Shelton Fortune      To Whom it may concern: This letter has been written at the patient's request. The above patient was seen at the [facility] for treatment of a medical condition today 4/15/2022. Symptoms started 1 week prior to this. This patient should be excused from attending work from 04/07/2022 through 04/18/2022.       Sincerely,          Arlen Lopez MD

## (undated) NOTE — LETTER
04/10/19        Randa Aldana  [address] [place] [address]      Dear Guillermo Lares records indicate that you have outstanding lab work and or testing that was ordered for you and has not yet been completed:  Lab Frequency Next Occurrence